# Patient Record
Sex: MALE | Race: WHITE | NOT HISPANIC OR LATINO | Employment: UNEMPLOYED | URBAN - METROPOLITAN AREA
[De-identification: names, ages, dates, MRNs, and addresses within clinical notes are randomized per-mention and may not be internally consistent; named-entity substitution may affect disease eponyms.]

---

## 2018-01-03 ENCOUNTER — HOSPITAL ENCOUNTER (EMERGENCY)
Facility: CLINIC | Age: 28
Discharge: OTHER INSTITUTION | End: 2018-01-04
Attending: EMERGENCY MEDICINE | Admitting: EMERGENCY MEDICINE
Payer: COMMERCIAL

## 2018-01-03 DIAGNOSIS — R45.851 SUICIDAL IDEATION: ICD-10-CM

## 2018-01-03 DIAGNOSIS — F15.10 METHAMPHETAMINE ABUSE (H): ICD-10-CM

## 2018-01-03 DIAGNOSIS — F11.20 UNCOMPLICATED OPIOID DEPENDENCE (H): ICD-10-CM

## 2018-01-03 DIAGNOSIS — F12.10 MARIJUANA ABUSE: ICD-10-CM

## 2018-01-03 LAB
ALBUMIN SERPL-MCNC: 3.8 G/DL (ref 3.4–5)
ALP SERPL-CCNC: 58 U/L (ref 40–150)
ALT SERPL W P-5'-P-CCNC: 19 U/L (ref 0–70)
AMPHETAMINES UR QL SCN: POSITIVE
ANION GAP SERPL CALCULATED.3IONS-SCNC: 8 MMOL/L (ref 3–14)
AST SERPL W P-5'-P-CCNC: 21 U/L (ref 0–45)
BARBITURATES UR QL: NEGATIVE
BASOPHILS # BLD AUTO: 0 10E9/L (ref 0–0.2)
BASOPHILS NFR BLD AUTO: 0.2 %
BENZODIAZ UR QL: NEGATIVE
BILIRUB SERPL-MCNC: 1.4 MG/DL (ref 0.2–1.3)
BUN SERPL-MCNC: 11 MG/DL (ref 7–30)
CALCIUM SERPL-MCNC: 9.1 MG/DL (ref 8.5–10.1)
CANNABINOIDS UR QL SCN: POSITIVE
CHLORIDE SERPL-SCNC: 104 MMOL/L (ref 94–109)
CO2 SERPL-SCNC: 27 MMOL/L (ref 20–32)
COCAINE UR QL: NEGATIVE
CREAT SERPL-MCNC: 0.99 MG/DL (ref 0.66–1.25)
DIFFERENTIAL METHOD BLD: NORMAL
EOSINOPHIL # BLD AUTO: 0.2 10E9/L (ref 0–0.7)
EOSINOPHIL NFR BLD AUTO: 2 %
ERYTHROCYTE [DISTWIDTH] IN BLOOD BY AUTOMATED COUNT: 12.2 % (ref 10–15)
ETHANOL UR QL SCN: NEGATIVE
GFR SERPL CREATININE-BSD FRML MDRD: >90 ML/MIN/1.7M2
GLUCOSE SERPL-MCNC: 85 MG/DL (ref 70–99)
HCT VFR BLD AUTO: 45.4 % (ref 40–53)
HGB BLD-MCNC: 15.4 G/DL (ref 13.3–17.7)
IMM GRANULOCYTES # BLD: 0 10E9/L (ref 0–0.4)
IMM GRANULOCYTES NFR BLD: 0.1 %
LYMPHOCYTES # BLD AUTO: 4 10E9/L (ref 0.8–5.3)
LYMPHOCYTES NFR BLD AUTO: 46.1 %
MCH RBC QN AUTO: 29.7 PG (ref 26.5–33)
MCHC RBC AUTO-ENTMCNC: 33.9 G/DL (ref 31.5–36.5)
MCV RBC AUTO: 88 FL (ref 78–100)
MONOCYTES # BLD AUTO: 0.6 10E9/L (ref 0–1.3)
MONOCYTES NFR BLD AUTO: 7.1 %
NEUTROPHILS # BLD AUTO: 3.8 10E9/L (ref 1.6–8.3)
NEUTROPHILS NFR BLD AUTO: 44.5 %
NRBC # BLD AUTO: 0 10*3/UL
NRBC BLD AUTO-RTO: 0 /100
OPIATES UR QL SCN: NEGATIVE
PLATELET # BLD AUTO: 201 10E9/L (ref 150–450)
POTASSIUM SERPL-SCNC: 3.7 MMOL/L (ref 3.4–5.3)
PROT SERPL-MCNC: 6.1 G/DL (ref 6.8–8.8)
RBC # BLD AUTO: 5.18 10E12/L (ref 4.4–5.9)
SODIUM SERPL-SCNC: 139 MMOL/L (ref 133–144)
WBC # BLD AUTO: 8.6 10E9/L (ref 4–11)

## 2018-01-03 PROCEDURE — 99285 EMERGENCY DEPT VISIT HI MDM: CPT | Mod: 25 | Performed by: EMERGENCY MEDICINE

## 2018-01-03 PROCEDURE — 25000132 ZZH RX MED GY IP 250 OP 250 PS 637: Performed by: EMERGENCY MEDICINE

## 2018-01-03 PROCEDURE — 90791 PSYCH DIAGNOSTIC EVALUATION: CPT

## 2018-01-03 PROCEDURE — 80320 DRUG SCREEN QUANTALCOHOLS: CPT | Mod: 59 | Performed by: FAMILY MEDICINE

## 2018-01-03 PROCEDURE — 85025 COMPLETE CBC W/AUTO DIFF WBC: CPT | Performed by: EMERGENCY MEDICINE

## 2018-01-03 PROCEDURE — 99284 EMERGENCY DEPT VISIT MOD MDM: CPT | Mod: Z6 | Performed by: EMERGENCY MEDICINE

## 2018-01-03 PROCEDURE — 80053 COMPREHEN METABOLIC PANEL: CPT | Performed by: EMERGENCY MEDICINE

## 2018-01-03 PROCEDURE — 80307 DRUG TEST PRSMV CHEM ANLYZR: CPT | Performed by: FAMILY MEDICINE

## 2018-01-03 RX ORDER — ALBUTEROL SULFATE 90 UG/1
1-2 AEROSOL, METERED RESPIRATORY (INHALATION) EVERY 6 HOURS PRN
Status: ON HOLD | COMMUNITY
End: 2024-07-06

## 2018-01-03 RX ORDER — NICOTINE 21 MG/24HR
1 PATCH, TRANSDERMAL 24 HOURS TRANSDERMAL ONCE
Status: COMPLETED | OUTPATIENT
Start: 2018-01-03 | End: 2018-01-03

## 2018-01-03 RX ORDER — TRAZODONE HYDROCHLORIDE 50 MG/1
50 TABLET, FILM COATED ORAL AT BEDTIME
Status: DISCONTINUED | OUTPATIENT
Start: 2018-01-03 | End: 2018-01-04 | Stop reason: HOSPADM

## 2018-01-03 RX ORDER — IBUPROFEN 800 MG/1
800 TABLET, FILM COATED ORAL EVERY 8 HOURS PRN
COMMUNITY

## 2018-01-03 RX ORDER — OLANZAPINE 10 MG/1
10 TABLET, ORALLY DISINTEGRATING ORAL ONCE
Status: DISCONTINUED | OUTPATIENT
Start: 2018-01-03 | End: 2018-01-03

## 2018-01-03 RX ORDER — ACETAMINOPHEN 500 MG
1000 TABLET ORAL EVERY 6 HOURS PRN
COMMUNITY

## 2018-01-03 RX ORDER — BUPRENORPHINE HYDROCHLORIDE AND NALOXONE HYDROCHLORIDE DIHYDRATE 8; 2 MG/1; MG/1
TABLET SUBLINGUAL
Status: ON HOLD | COMMUNITY
End: 2024-07-06

## 2018-01-03 RX ADMIN — TRAZODONE HYDROCHLORIDE 50 MG: 50 TABLET ORAL at 21:31

## 2018-01-03 RX ADMIN — NICOTINE 1 PATCH: 21 PATCH, EXTENDED RELEASE TRANSDERMAL at 20:04

## 2018-01-03 ASSESSMENT — ENCOUNTER SYMPTOMS
NERVOUS/ANXIOUS: 1
HALLUCINATIONS: 0
DYSPHORIC MOOD: 1

## 2018-01-03 NOTE — ED PROVIDER NOTES
"  History     Chief Complaint   Patient presents with     Depression     increasing depression and anxiety since breakup a few weeks ago, denies SI currently \"but I don't want to get any worse; I'm getting close to that point\"     HPI  Shailesh Armendariz is a 27 year old male who was brought to the ED by his father for suicidal thoughts.  He says he feels that if he doesn't get help, he won't make a suicide attempt, he'll complete the action.  He has hx of depression and anxiety for 10 years.  He also has hx of opiate dependence  (on suboxone currently via [place] ), methamphetamine abuse and thc abuse.  He says he has thoughts to crash the car which would be easy or take a  full of pills.  He and his fiance broke up 2 weeks ago.  They have broken up several times in the past 2 years.  She was using meth.  He also uses this on and off, but prefers to not use.  He was living with her and her family in a 3 bedroom apartment with 7 others.  He is now living with his dad.  His grandma passed away.  He is not employed.  He has hx of benzo abuse but hasn't used in 3-4 years.  He has no therapist or psychiatrist.  He says he has never been admitted for mental health issues.       I have reviewed the Medications, Allergies, Past Medical and Surgical History, and Social History in the Epic system.    Review of Systems   Psychiatric/Behavioral: Positive for dysphoric mood and suicidal ideas. Negative for hallucinations. Self-injury: he tried cutting becasue his fiance cuts. The patient is nervous/anxious.    All other systems reviewed and are negative.      Physical Exam   BP: (!) 142/99  Pulse: 130  Temp: 99.5  F (37.5  C)  Resp: 16  SpO2: 96 %      Physical Exam   Constitutional: He is oriented to person, place, and time. He appears well-developed and well-nourished. No distress.   HENT:   Head: Normocephalic and atraumatic.   Right Ear: External ear normal.   Left Ear: External ear normal.   Nose: Nose normal. "   Mouth/Throat: Oropharynx is clear and moist.   Eyes: EOM are normal. Pupils are equal, round, and reactive to light.   Neck: Normal range of motion.   Cardiovascular: Normal rate, regular rhythm and normal heart sounds.    Pulmonary/Chest: Effort normal and breath sounds normal.   Abdominal: Soft. Bowel sounds are normal. There is no tenderness.   Musculoskeletal: Normal range of motion.   Neurological: He is alert and oriented to person, place, and time.   Skin: Skin is warm and dry. He is not diaphoretic.   Psychiatric: His speech is normal and behavior is normal. Judgment normal. His mood appears anxious. Cognition and memory are normal. He expresses suicidal ideation. He expresses suicidal plans.   Nursing note and vitals reviewed.      ED Course     ED Course     Procedures           Labs Ordered and Resulted from Time of ED Arrival Up to the Time of Departure from the ED   DRUG ABUSE SCREEN 6 CHEM DEP URINE ([facility]) - Abnormal; Notable for the following:        Result Value    Amphetamine Qual Urine Positive (*)     Cannabinoids Qual Urine Positive (*)     All other components within normal limits            Assessments & Plan (with Medical Decision Making)   The patient had his father bring him to the ER today for admission. He says he has struggled with depression and anxiety for the past 10 years.  He has been having suicidal thoughts and feels that if he doesn't get help soon he will kill himself.   He is on suboxone for opiate dependence. He uses thc regularly.  He uses meth occasionally.  He had his suboxone dose today.  He is medically stable for admission to inpatient mental health.  There are currently no beds available so he will sleep in the ED awaiting available inpatient bed.     He is starting to wonder if he would like to leave due to the wait, but for now is welling to stay.  I would place him on a hold if he chooses to leave.  He was given trazodone to help him sleep tonight.     I have  reviewed the nursing notes.    I have reviewed the findings, diagnosis, plan and need for follow up with the patient.    New Prescriptions    No medications on file       Final diagnoses:   Suicidal ideation   Marijuana abuse   Methamphetamine abuse   Uncomplicated opioid dependence (H)       1/3/2018   [facility], [place], EMERGENCY DEPARTMENT        Yudi Smith MD  01/03/18 1584

## 2018-01-04 VITALS
WEIGHT: 150 LBS | DIASTOLIC BLOOD PRESSURE: 52 MMHG | OXYGEN SATURATION: 94 % | RESPIRATION RATE: 18 BRPM | TEMPERATURE: 99.5 F | SYSTOLIC BLOOD PRESSURE: 96 MMHG | HEART RATE: 130 BPM

## 2018-01-04 NOTE — PHARMACY-ADMISSION MEDICATION HISTORY
Admission medication history interview status for the 1/3/2018 admission is complete. See Epic admission navigator for allergy information, pharmacy, prior to admission medications and immunization status.     Medication history interview sources:    -Patient was a good historian and familiar with most medications. Patient could not recall names/doses of all supplements he is taking.     Changes made to PTA medication list (reason)  Added:   Per patient:  -Fish Oil Supplement   -One-A-Day Mens Multivitamin   -Stress B Vitamin  -Vitamin B12  -Vitamin B6  -Turmeric Supplement   -Coconut Oil Supplement   Deleted:   -N/A  Changed:   -N/A    Additional medication history information (including reliability of information, actions taken by pharmacist):  -Verified Suboxone dose of 20 mg daily with [facility]). Per [place], patient takes Suboxone all at once in the clinic and does not take any home. Patient last visited [place] on 1/1/17. Per patient, he wants to transition off Suboxone (unknown reason).   -Patient was residing in [place], [place] and obtained inhalers through a free clinic.  -Per patient, he also takes about seven or eight vitamins/supplements but cannot name them all. He tries to take them daily.   -Per patient, he received an influenza vaccination about two months ago.       Prior to Admission medications    Medication Sig Last Dose Taking? Auth Provider   fluticasone-salmeterol (ADVAIR) 500-50 MCG/DOSE diskus inhaler Inhale 1 puff into the lungs every 12 hours Past Week at Unknown time Yes Reported, Patient   buprenorphine-naloxone (SUBOXONE) 8-2 MG SUBL sublingual tablet Place 20mg under the tongue daily at [facility]) 1/1/2018 at Unknown time Yes Reported, Patient   albuterol (PROAIR HFA/PROVENTIL HFA/VENTOLIN HFA) 108 (90 BASE) MCG/ACT Inhaler Inhale 1-2 puffs into the lungs every 6 hours as needed for shortness of breath / dyspnea 1/3/2018 at Unknown time Yes Unknown,  Entered By History   Omega-3 Fatty Acids (FISH OIL PO) Take 1 capsule by mouth daily (unknown dose) Past Week at Unknown time Yes Unknown, Entered By History   Multiple Vitamin (ONE-A-DAY MENS PO) Take 1 tablet by mouth daily Past Week at Unknown time Yes Unknown, Entered By History   Multiple Vitamin (STRESS B PO) Take 1 tablet by mouth daily (unknown dose) Past Week at Unknown time Yes Unknown, Entered By History   Cyanocobalamin (B-12 PO) Take 1 tablet by mouth daily (unknown dose) Past Week at Unknown time Yes Unknown, Entered By History   Pyridoxine HCl (VITAMIN B6 PO) Take 1 tablet by mouth daily (unknown dose) Past Week at Unknown time Yes Unknown, Entered By History   TURMERIC PO Take 1 capsule by mouth daily (unknown dose) Past Week at Unknown time Yes Unknown, Entered By History   COCONUT OIL PO Take 1 capsule by mouth daily (unknown dose) Past Week at Unknown time Yes Unknown, Entered By History   acetaminophen (TYLENOL) 500 MG tablet Take 1,000 mg by mouth every 6 hours as needed for mild pain 1/3/2018 at Unknown time Yes Unknown, Entered By History   ibuprofen (ADVIL/MOTRIN) 800 MG tablet Take 800 mg by mouth every 8 hours as needed for moderate pain Past Week at Unknown time Yes Unknown, Entered By History         Medication history completed by: Antonette Pearl, Pharmacy Intern

## 2018-01-15 ENCOUNTER — COMMUNICATION - HEALTHEAST (OUTPATIENT)
Dept: INTERNAL MEDICINE | Facility: CLINIC | Age: 28
End: 2018-01-15

## 2018-01-26 ENCOUNTER — RECORDS - HEALTHEAST (OUTPATIENT)
Dept: ADMINISTRATIVE | Facility: OTHER | Age: 28
End: 2018-01-26

## 2020-10-01 ENCOUNTER — COMMUNICATION - HEALTHEAST (OUTPATIENT)
Dept: BEHAVIORAL HEALTH | Facility: CLINIC | Age: 30
End: 2020-10-01

## 2020-10-03 ENCOUNTER — COMMUNICATION - HEALTHEAST (OUTPATIENT)
Dept: SCHEDULING | Facility: CLINIC | Age: 30
End: 2020-10-03

## 2020-10-05 ENCOUNTER — COMMUNICATION - HEALTHEAST (OUTPATIENT)
Dept: SCHEDULING | Facility: CLINIC | Age: 30
End: 2020-10-05

## 2021-05-31 VITALS — BODY MASS INDEX: 20.81 KG/M2 | WEIGHT: 145 LBS | BODY MASS INDEX: 20.22 KG/M2 | HEIGHT: 70 IN

## 2021-06-11 NOTE — TELEPHONE ENCOUNTER
S: [facility] ED A&R calling with a 30 yr old male with SI with a plan and delusions    B: pt is a 30 yr old male with a hx of substance use and concerns for SI with a plan. Pt has a recently relapse of substances and has potential concerns of paranoia and delusions. Pt thinks his father has bodies in his car and straying chemicals. Pt was sober for 2 years and possibly had a relapse. Pt utox + for amphetamines and cannabis. Pt called his sisters and left a goodbye message and that he was going to drown himself. [place], wandering near [place]. Pt will not state if there is active AH/VH but per collateral, pt remains paranoid with family. Pt is guarded in the ED. Pt is medically cleared and ambulating. Pt is not aggressive in the ED. Asymptomatic for COVD in ED. Needs to be collected     A: emergency medical hold     R:   Paged provider @ 1:18 pm  Bronson/[place]  Notified unit @ 1:21 pm   VM recording    Notified @ 1:25 pm  Notified ED Charge RN @ 1:27 pm

## 2021-06-16 PROBLEM — F12.20 SEVERE CANNABIS USE DISORDER (H): Chronic | Status: ACTIVE | Noted: 2018-01-04

## 2021-06-16 PROBLEM — F11.20 SEVERE OPIOID USE DISORDER (H): Chronic | Status: ACTIVE | Noted: 2018-01-04

## 2021-06-16 PROBLEM — F19.959 SUBSTANCE-INDUCED PSYCHOTIC DISORDER (H): Status: ACTIVE | Noted: 2018-10-22

## 2021-06-16 PROBLEM — F15.20 SEVERE AMPHETAMINE SUBSTANCE USE DISORDER (H): Chronic | Status: ACTIVE | Noted: 2018-01-04

## 2021-06-16 PROBLEM — F29 PSYCHOSIS, ATYPICAL (H): Status: ACTIVE | Noted: 2020-10-01

## 2021-06-16 PROBLEM — Z02.9 ENCOUNTERS FOR ADMINISTRATIVE PURPOSE: Chronic | Status: ACTIVE | Noted: 2018-01-04

## 2021-06-16 PROBLEM — R45.851 SUICIDAL IDEATION: Status: ACTIVE | Noted: 2020-10-01

## 2021-06-16 PROBLEM — F31.62 BIPOLAR MIXED AFFECTIVE DISORDER, MODERATE (H): Chronic | Status: ACTIVE | Noted: 2018-10-22

## 2023-01-02 VITALS
WEIGHT: 160 LBS | DIASTOLIC BLOOD PRESSURE: 98 MMHG | SYSTOLIC BLOOD PRESSURE: 144 MMHG | HEART RATE: 78 BPM | HEIGHT: 70 IN | TEMPERATURE: 97.8 F | OXYGEN SATURATION: 96 % | RESPIRATION RATE: 18 BRPM | BODY MASS INDEX: 22.9 KG/M2

## 2023-01-02 PROCEDURE — 99283 EMERGENCY DEPT VISIT LOW MDM: CPT

## 2023-01-03 ENCOUNTER — HOSPITAL ENCOUNTER (EMERGENCY)
Facility: HOSPITAL | Age: 33
Discharge: HOME | End: 2023-01-03
Attending: EMERGENCY MEDICINE | Admitting: EMERGENCY MEDICINE
Payer: COMMERCIAL

## 2023-01-03 DIAGNOSIS — J01.00 ACUTE NON-RECURRENT MAXILLARY SINUSITIS: ICD-10-CM

## 2023-01-03 PROBLEM — F19.90 SUBSTANCE USE DISORDER: Status: ACTIVE | Noted: 2022-02-28

## 2023-01-03 PROBLEM — J45.909 ASTHMA, UNSPECIFIED ASTHMA SEVERITY, UNSPECIFIED WHETHER COMPLICATED, UNSPECIFIED WHETHER PERSISTENT: Status: ACTIVE | Noted: 2019-01-17

## 2023-01-03 PROBLEM — D23.9 BENIGN NEOPLASM OF SKIN: Status: ACTIVE | Noted: 2019-01-17

## 2023-01-03 PROBLEM — J34.89 OTHER DISEASES OF NASAL CAVITY AND SINUSES: Status: ACTIVE | Noted: 2019-06-03

## 2023-01-03 PROBLEM — F41.9 ANXIETY: Chronic | Status: ACTIVE | Noted: 2018-01-04

## 2023-01-03 PROBLEM — H65.90 NONSUPPURATIVE OTITIS MEDIA: Status: ACTIVE | Noted: 2019-01-17

## 2023-01-03 ASSESSMENT — ENCOUNTER SYMPTOMS
HEADACHES: 1
EYE PAIN: 1
PHOTOPHOBIA: 1
SINUS PAIN: 1
FEVER: 1
CHILLS: 1

## 2023-01-03 NOTE — ED TRIAGE NOTES
Patient developed a fever and right sinus pain approximately six hours ago. Ibuprofen at home. Patient reports a history of hospitalization following sinusitis, reports a history of periorbital cellulitis related to sinus infection. Reports pain and pressure in right eye, right upper jaw and teeth.      Triage Assessment     Row Name 01/02/23 2601       Triage Assessment (Adult)    Airway WDL WDL       Respiratory WDL    Respiratory WDL WDL       Skin Circulation/Temperature WDL    Skin Circulation/Temperature WDL WDL       Cardiac WDL    Cardiac WDL WDL       Peripheral/Neurovascular WDL    Peripheral Neurovascular WDL WDL       Cognitive/Neuro/Behavioral WDL    Cognitive/Neuro/Behavioral WDL WDL

## 2023-01-03 NOTE — ED PROVIDER NOTES
EMERGENCY DEPARTMENT ENCOUNTER      NAME: Shailesh Armendariz  AGE: 32 year old male  YOB: 1990  MRN: [id number]  EVALUATION DATE & TIME: No admission date for patient encounter.    PCP: No Ref-Primary, Physician    ED PROVIDER: Nicole Hassan MD      Chief Complaint   Patient presents with     Fever     Sinusitis         FINAL IMPRESSION:  1. Acute non-recurrent maxillary sinusitis          ED COURSE & MEDICAL DECISION MAKING:    Pertinent Labs & Imaging studies reviewed. (See chart for details)  32 year old male presents to the Emergency Department for evaluation of right-sided facial pain that started about 4 to 6 hours prior to arrival.  The patient noted swelling on the right side of the face.  On my exam, patient does have some soft tissue edema overlying the right maxillary sinus.  The patient's main concern is that he previously had sinusitis in ninth grade which then became a periorbital cellulitis and required admission to the hospital.  The last time he had any dental work was 2 months ago when he had dental fillings.  Since then, he denies any other issues.  He is not immunosuppressed, is not a diabetic.  Given the tenderness over the right maxillary sinus, the patient will be initiated on oral antibiotics.  I discussed with him reasons to return including increased swelling, pain, worsening symptoms after 48 hours on antibiotics, otherwise close follow-up with his primary care provider.  Patient and patient's father at the bedside are comfortable with this plan.    12:32 AM I met with the patient, obtained history, performed an initial exam, and discussed options and plan for diagnostics and treatment here in the ED. We discussed the plan for discharge and the patient is agreeable. Reviewed supportive cares, symptomatic treatment, outpatient follow up, and reasons to return to the Emergency Department. Patient to be discharged by ED RN.     At the conclusion of the encounter I discussed the  results of all of the tests and the disposition. The questions were answered. The patient or family acknowledged understanding and was agreeable with the care plan.       Medical Decision Making    History:    Supplemental history from: N/A    External Record(s) reviewed: Documented in HPI, if applicable.    Work Up:    Chart documentation includes differential considered and any EKGs or imaging independently interpreted by provider.    In additional to work up documented, I considered the following work up: See chart documentation, if applicable.    External consultation:    Discussion of management with another provider: See chart documentation, if applicable    Complicating factors:    Care impacted by chronic illness: N/A    Care affected by social determinants of health: N/A    Disposition considerations: Discharge. I prescribed additional prescription strength medication(s) as charted. N/A.        MEDICATIONS GIVEN IN THE EMERGENCY:  Medications - No data to display    NEW PRESCRIPTIONS STARTED AT TODAY'S ER VISIT  Current Discharge Medication List      START taking these medications    Details   amoxicillin-clavulanate (AUGMENTIN) 875-125 MG tablet Take 1 tablet by mouth 2 times daily  Qty: 14 tablet, Refills: 0                =================================================================    [facility]    Patient information was obtained from: patient     Use of : N/A         Shailesh Armendariz is a 32 year old male with a pertinent history of asthma and polysubstance use who presents to this ED by walk in for evaluation of eye pain.    Around 6 hours ago, the patient noticed swelling in his right maxillary sinuses with associated sharp pain that radiates into his right eye and temple and slight photophobia. Pain is exacerbated with opening and closing his eye. He had some relief of symptoms after taking 600 mg Ibuprofen and icing the area soon after onset. He also endorses congestion and tactile fever and  chills. He does not have a thermometer at home. Notes that he had similar symptoms in 9th grade, and it turned into periorbital cellulitis, so he is concerned for that. Also notes he had dental fillings and cleaning a few months ago without issue and has not had any tooth pain. No medical history and is not immunosuppressed. Patient is a smoker. He denies cough or any other complaints at this time.      REVIEW OF SYSTEMS   Review of Systems   Constitutional: Positive for chills and fever (tactile).   HENT: Positive for congestion and sinus pain.         Positive for sinus swelling (right maxillary).   Eyes: Positive for photophobia and pain (right, radiates from sinus).   Neurological: Positive for headaches (right temple, radiates from sinus).        PAST MEDICAL HISTORY:  Past Medical History:   Diagnosis Date     Asthma      Bipolar disorder (H)      Uncomplicated asthma        PAST SURGICAL HISTORY:  History reviewed. No pertinent surgical history.        CURRENT MEDICATIONS:    amoxicillin-clavulanate (AUGMENTIN) 875-125 MG tablet  acetaminophen (TYLENOL) 500 MG tablet  albuterol (PROAIR HFA/PROVENTIL HFA/VENTOLIN HFA) 108 (90 BASE) MCG/ACT Inhaler  buprenorphine-naloxone (SUBOXONE) 8-2 MG SUBL sublingual tablet  COCONUT OIL PO  Cyanocobalamin (B-12 PO)  fluticasone-salmeterol (ADVAIR) 500-50 MCG/DOSE diskus inhaler  ibuprofen (ADVIL/MOTRIN) 800 MG tablet  Multiple Vitamin (ONE-A-DAY MENS PO)  Multiple Vitamin (STRESS B PO)  Omega-3 Fatty Acids (FISH OIL PO)  Pyridoxine HCl (VITAMIN B6 PO)  TURMERIC PO        ALLERGIES:  No Known Allergies    FAMILY HISTORY:  Family History   Problem Relation Age of Onset     Schizophrenia Paternal Grandfather      Schizophrenia Mother        SOCIAL HISTORY:   Social History     Socioeconomic History     Marital status: Single   Tobacco Use     Smoking status: Every Day     Packs/day: 1.00     Years: 10.00     Pack years: 10.00     Types: Cigarettes     Smokeless tobacco: Never  "  Substance and Sexual Activity     Alcohol use: Yes     Comment: Alcoholic Drinks/day: 0ccasional     Drug use: Yes     Types: Marijuana, Methamphetamines, Amphetamines     Comment: Drug use: hx benzodiazepines, PCP, heroin- including IV     Sexual activity: Yes     Partners: Female     Birth control/protection: Condom       VITALS:  BP (!) 144/98   Pulse 78   Temp 97.8  F (36.6  C) (Temporal)   Resp 18   Ht 1.778 m (5' 10\")   Wt 72.6 kg (160 lb)   SpO2 96%   BMI 22.96 kg/m      PHYSICAL EXAM    Constitutional: Well developed, Well nourished, NAD  HENT: Soft tissue edema overlying right maxillary sinuses with associated erythema. Tender to palpation. Dentition intact. Normocephalic, Atraumatic, Bilateral external ears normal, Oropharynx normal, mucous membranes moist, Nose normal.   Neck- Normal range of motion, No tenderness, Supple, No stridor.  Eyes: No periorbital edema or erythema. EOM intact with no associated pain. PERRL, Conjunctiva normal, No discharge.   Respiratory: Normal breath sounds, No respiratory distress  Cardiovascular: Normal heart rate, Regular rhythm  GI: Bowel sounds normal, Soft, No tenderness,   Musculoskeletal: No edema. Good range of motion in all major joints. No tenderness to palpation or major deformities noted.   Integument: Warm, Dry, No erythema, No rash  Neurologic: Alert & oriented x 3, Normal motor function, Normal sensory function, No focal deficits noted. Normal gait.   Psychiatric: Affect normal, Judgment normal, Mood normal.     LAB:  All pertinent labs reviewed and interpreted.       RADIOLOGY:  Reviewed all pertinent imaging. Please see official radiology report.  No orders to display       EKG:    None    PROCEDURES:   None      I, Ivonne Macario, am serving as a scribe to document services personally performed by Nicole Hassan, based on my observation and the provider's statements to me. I, Nicole Hassan MD, attest that Ivonne Macario is acting in a scribe " capacity, has observed my performance of the services and has documented them in accordance with my direction.    Nicole Hassan MD  Emergency Medicine  [facility] EMERGENCY DEPARTMENT  [address] [place] [id number] [phone]     Nicole Hassan MD  01/03/23 0135

## 2024-07-05 ENCOUNTER — TELEPHONE (OUTPATIENT)
Dept: BEHAVIORAL HEALTH | Facility: CLINIC | Age: 34
End: 2024-07-05

## 2024-07-05 ENCOUNTER — HOSPITAL ENCOUNTER (INPATIENT)
Facility: CLINIC | Age: 34
LOS: 4 days | Discharge: PSYCHIATRIC OR SUBSTANCE USE FACILITY | End: 2024-07-09
Attending: FAMILY MEDICINE | Admitting: PSYCHIATRY & NEUROLOGY
Payer: MEDICAID

## 2024-07-05 DIAGNOSIS — F10.229 ALCOHOL DEPENDENCE WITH INTOXICATION WITH COMPLICATION (H): ICD-10-CM

## 2024-07-05 LAB
ALBUMIN SERPL BCG-MCNC: 4.9 G/DL (ref 3.5–5.2)
ALP SERPL-CCNC: 58 U/L (ref 40–150)
ALT SERPL W P-5'-P-CCNC: 47 U/L (ref 0–70)
AMPHETAMINES UR QL SCN: ABNORMAL
ANION GAP SERPL CALCULATED.3IONS-SCNC: 16 MMOL/L (ref 7–15)
AST SERPL W P-5'-P-CCNC: 41 U/L (ref 0–45)
BARBITURATES UR QL SCN: ABNORMAL
BASOPHILS # BLD AUTO: 0.1 10E3/UL (ref 0–0.2)
BASOPHILS NFR BLD AUTO: 1 %
BENZODIAZ UR QL SCN: ABNORMAL
BILIRUB SERPL-MCNC: 0.2 MG/DL
BUN SERPL-MCNC: 9.3 MG/DL (ref 6–20)
BZE UR QL SCN: ABNORMAL
CALCIUM SERPL-MCNC: 9.8 MG/DL (ref 8.6–10)
CANNABINOIDS UR QL SCN: ABNORMAL
CHLORIDE SERPL-SCNC: 103 MMOL/L (ref 98–107)
CREAT SERPL-MCNC: 0.89 MG/DL (ref 0.67–1.17)
DEPRECATED HCO3 PLAS-SCNC: 27 MMOL/L (ref 22–29)
EGFRCR SERPLBLD CKD-EPI 2021: >90 ML/MIN/1.73M2
EOSINOPHIL # BLD AUTO: 0.1 10E3/UL (ref 0–0.7)
EOSINOPHIL NFR BLD AUTO: 2 %
ERYTHROCYTE [DISTWIDTH] IN BLOOD BY AUTOMATED COUNT: 13.5 % (ref 10–15)
ETHANOL SERPL-MCNC: 0.27 G/DL
FENTANYL UR QL: ABNORMAL
GLUCOSE SERPL-MCNC: 98 MG/DL (ref 70–99)
HCT VFR BLD AUTO: 47.8 % (ref 40–53)
HGB BLD-MCNC: 16.3 G/DL (ref 13.3–17.7)
IMM GRANULOCYTES # BLD: 0.1 10E3/UL
IMM GRANULOCYTES NFR BLD: 1 %
LYMPHOCYTES # BLD AUTO: 2 10E3/UL (ref 0.8–5.3)
LYMPHOCYTES NFR BLD AUTO: 26 %
MAGNESIUM SERPL-MCNC: 2.2 MG/DL (ref 1.7–2.3)
MCH RBC QN AUTO: 32.3 PG (ref 26.5–33)
MCHC RBC AUTO-ENTMCNC: 34.1 G/DL (ref 31.5–36.5)
MCV RBC AUTO: 95 FL (ref 78–100)
MONOCYTES # BLD AUTO: 0.6 10E3/UL (ref 0–1.3)
MONOCYTES NFR BLD AUTO: 7 %
NEUTROPHILS # BLD AUTO: 4.9 10E3/UL (ref 1.6–8.3)
NEUTROPHILS NFR BLD AUTO: 63 %
NRBC # BLD AUTO: 0 10E3/UL
NRBC BLD AUTO-RTO: 0 /100
OPIATES UR QL SCN: ABNORMAL
PCP QUAL URINE (ROCHE): ABNORMAL
PLATELET # BLD AUTO: 287 10E3/UL (ref 150–450)
POTASSIUM SERPL-SCNC: 4.2 MMOL/L (ref 3.4–5.3)
PROT SERPL-MCNC: 7.4 G/DL (ref 6.4–8.3)
RBC # BLD AUTO: 5.04 10E6/UL (ref 4.4–5.9)
SODIUM SERPL-SCNC: 146 MMOL/L (ref 135–145)
WBC # BLD AUTO: 7.7 10E3/UL (ref 4–11)

## 2024-07-05 PROCEDURE — 82374 ASSAY BLOOD CARBON DIOXIDE: CPT | Performed by: FAMILY MEDICINE

## 2024-07-05 PROCEDURE — 82040 ASSAY OF SERUM ALBUMIN: CPT | Performed by: FAMILY MEDICINE

## 2024-07-05 PROCEDURE — 128N000004 HC R&B CD ADULT

## 2024-07-05 PROCEDURE — 80307 DRUG TEST PRSMV CHEM ANLYZR: CPT | Performed by: FAMILY MEDICINE

## 2024-07-05 PROCEDURE — 85025 COMPLETE CBC W/AUTO DIFF WBC: CPT | Performed by: FAMILY MEDICINE

## 2024-07-05 PROCEDURE — 83735 ASSAY OF MAGNESIUM: CPT | Performed by: FAMILY MEDICINE

## 2024-07-05 PROCEDURE — 250N000013 HC RX MED GY IP 250 OP 250 PS 637

## 2024-07-05 PROCEDURE — 99285 EMERGENCY DEPT VISIT HI MDM: CPT | Performed by: FAMILY MEDICINE

## 2024-07-05 PROCEDURE — 250N000013 HC RX MED GY IP 250 OP 250 PS 637: Performed by: FAMILY MEDICINE

## 2024-07-05 PROCEDURE — 36415 COLL VENOUS BLD VENIPUNCTURE: CPT | Performed by: FAMILY MEDICINE

## 2024-07-05 PROCEDURE — 82077 ASSAY SPEC XCP UR&BREATH IA: CPT | Performed by: FAMILY MEDICINE

## 2024-07-05 PROCEDURE — 250N000011 HC RX IP 250 OP 636

## 2024-07-05 PROCEDURE — HZ2ZZZZ DETOXIFICATION SERVICES FOR SUBSTANCE ABUSE TREATMENT: ICD-10-PCS | Performed by: PSYCHIATRY & NEUROLOGY

## 2024-07-05 RX ORDER — ATENOLOL 50 MG/1
50 TABLET ORAL DAILY PRN
Status: DISCONTINUED | OUTPATIENT
Start: 2024-07-05 | End: 2024-07-06

## 2024-07-05 RX ORDER — FOLIC ACID 1 MG/1
1 TABLET ORAL DAILY
Status: DISCONTINUED | OUTPATIENT
Start: 2024-07-05 | End: 2024-07-09 | Stop reason: HOSPADM

## 2024-07-05 RX ORDER — OLANZAPINE 10 MG/1
10 TABLET ORAL 3 TIMES DAILY PRN
Status: DISCONTINUED | OUTPATIENT
Start: 2024-07-05 | End: 2024-07-09 | Stop reason: HOSPADM

## 2024-07-05 RX ORDER — DIAZEPAM 5 MG
5-20 TABLET ORAL EVERY 30 MIN PRN
Status: DISCONTINUED | OUTPATIENT
Start: 2024-07-05 | End: 2024-07-05

## 2024-07-05 RX ORDER — NICOTINE 21 MG/24HR
1 PATCH, TRANSDERMAL 24 HOURS TRANSDERMAL ONCE
Status: COMPLETED | OUTPATIENT
Start: 2024-07-05 | End: 2024-07-06

## 2024-07-05 RX ORDER — TRAZODONE HYDROCHLORIDE 50 MG/1
50 TABLET, FILM COATED ORAL
Status: DISCONTINUED | OUTPATIENT
Start: 2024-07-05 | End: 2024-07-09 | Stop reason: HOSPADM

## 2024-07-05 RX ORDER — DIAZEPAM 5 MG
5-20 TABLET ORAL EVERY 30 MIN PRN
Status: DISCONTINUED | OUTPATIENT
Start: 2024-07-05 | End: 2024-07-08

## 2024-07-05 RX ORDER — OLANZAPINE 10 MG/2ML
10 INJECTION, POWDER, FOR SOLUTION INTRAMUSCULAR 3 TIMES DAILY PRN
Status: DISCONTINUED | OUTPATIENT
Start: 2024-07-05 | End: 2024-07-09 | Stop reason: HOSPADM

## 2024-07-05 RX ORDER — AMOXICILLIN 250 MG
1 CAPSULE ORAL 2 TIMES DAILY PRN
Status: DISCONTINUED | OUTPATIENT
Start: 2024-07-05 | End: 2024-07-09 | Stop reason: HOSPADM

## 2024-07-05 RX ORDER — MAGNESIUM HYDROXIDE/ALUMINUM HYDROXICE/SIMETHICONE 120; 1200; 1200 MG/30ML; MG/30ML; MG/30ML
30 SUSPENSION ORAL EVERY 4 HOURS PRN
Status: DISCONTINUED | OUTPATIENT
Start: 2024-07-05 | End: 2024-07-09 | Stop reason: HOSPADM

## 2024-07-05 RX ORDER — ONDANSETRON 4 MG/1
4 TABLET, FILM COATED ORAL EVERY 6 HOURS PRN
Status: DISCONTINUED | OUTPATIENT
Start: 2024-07-05 | End: 2024-07-09 | Stop reason: HOSPADM

## 2024-07-05 RX ORDER — LOPERAMIDE HCL 2 MG
2 CAPSULE ORAL 4 TIMES DAILY PRN
Status: DISCONTINUED | OUTPATIENT
Start: 2024-07-05 | End: 2024-07-09 | Stop reason: HOSPADM

## 2024-07-05 RX ORDER — ACETAMINOPHEN 325 MG/1
650 TABLET ORAL EVERY 4 HOURS PRN
Status: DISCONTINUED | OUTPATIENT
Start: 2024-07-05 | End: 2024-07-09 | Stop reason: HOSPADM

## 2024-07-05 RX ORDER — HYDROXYZINE HYDROCHLORIDE 25 MG/1
25 TABLET, FILM COATED ORAL EVERY 4 HOURS PRN
Status: DISCONTINUED | OUTPATIENT
Start: 2024-07-05 | End: 2024-07-09 | Stop reason: HOSPADM

## 2024-07-05 RX ADMIN — NICOTINE 1 PATCH: 21 PATCH, EXTENDED RELEASE TRANSDERMAL at 12:08

## 2024-07-05 RX ADMIN — ONDANSETRON HYDROCHLORIDE 4 MG: 4 TABLET, FILM COATED ORAL at 18:37

## 2024-07-05 RX ADMIN — ACETAMINOPHEN 650 MG: 325 TABLET, FILM COATED ORAL at 18:37

## 2024-07-05 RX ADMIN — DIAZEPAM 10 MG: 5 TABLET ORAL at 20:13

## 2024-07-05 RX ADMIN — THIAMINE HCL TAB 100 MG 100 MG: 100 TAB at 16:16

## 2024-07-05 RX ADMIN — TRAZODONE HYDROCHLORIDE 50 MG: 50 TABLET ORAL at 21:29

## 2024-07-05 RX ADMIN — FOLIC ACID 1 MG: 1 TABLET ORAL at 16:16

## 2024-07-05 ASSESSMENT — ACTIVITIES OF DAILY LIVING (ADL)
ADLS_ACUITY_SCORE: 35
ADLS_ACUITY_SCORE: 30
ORAL_HYGIENE: INDEPENDENT
ADLS_ACUITY_SCORE: 35
ADLS_ACUITY_SCORE: 30
HYGIENE/GROOMING: INDEPENDENT
ADLS_ACUITY_SCORE: 30
ADLS_ACUITY_SCORE: 30
DRESS: STREET CLOTHES;INDEPENDENT
ADLS_ACUITY_SCORE: 35
ADLS_ACUITY_SCORE: 30
ADLS_ACUITY_SCORE: 35
ADLS_ACUITY_SCORE: 30
ADLS_ACUITY_SCORE: 30

## 2024-07-05 ASSESSMENT — LIFESTYLE VARIABLES: SKIP TO QUESTIONS 9-10: 0

## 2024-07-05 ASSESSMENT — COLUMBIA-SUICIDE SEVERITY RATING SCALE - C-SSRS
6. HAVE YOU EVER DONE ANYTHING, STARTED TO DO ANYTHING, OR PREPARED TO DO ANYTHING TO END YOUR LIFE?: NO
2. HAVE YOU ACTUALLY HAD ANY THOUGHTS OF KILLING YOURSELF IN THE PAST MONTH?: NO
1. IN THE PAST MONTH, HAVE YOU WISHED YOU WERE DEAD OR WISHED YOU COULD GO TO SLEEP AND NOT WAKE UP?: NO

## 2024-07-05 NOTE — ED PROVIDER NOTES
[facility] EMERGENCY DEPARTMENT ([facility])    7/05/24      ED PROVIDER NOTE   Ghulam C   History     Chief Complaint   Patient presents with    Alcohol Problem     Last drink this morning @ 0800.  Drinks 1/5 whiskey per day.  No history of seizures.  THC rare     The history is provided by the patient and medical records.     Shailesh Armendariz is a 33 year old male with history of bipolar disorder, alcohol use disorder, prior polysubstance use (meth, cannabinoids, opiates previously on Suboxone) who presents to the emergency department seeking alcohol detox. He has been drinking 1/5th to half a liter of vodka a day, last drank this morning. No history of withdrawal seizures or DTs. When he stops drinking he develops shakes, sweats, nausea and vomiting. He is planning on entering treatment 3 days from now. He also had THC drinks a couple days ago. He is off suboxone, denies other substance use. Denies psychiatric symptoms; no suicidal or homicidal ideation.     Past Medical History  Past Medical History:   Diagnosis Date    Asthma     Bipolar disorder (H)     Uncomplicated asthma      History reviewed. No pertinent surgical history.  acetaminophen (TYLENOL) 500 MG tablet  albuterol (PROAIR HFA/PROVENTIL HFA/VENTOLIN HFA) 108 (90 BASE) MCG/ACT Inhaler  amoxicillin-clavulanate (AUGMENTIN) 875-125 MG tablet  buprenorphine-naloxone (SUBOXONE) 8-2 MG SUBL sublingual tablet  COCONUT OIL PO  Cyanocobalamin (B-12 PO)  fluticasone-salmeterol (ADVAIR) 500-50 MCG/DOSE diskus inhaler  ibuprofen (ADVIL/MOTRIN) 800 MG tablet  Multiple Vitamin (ONE-A-DAY MENS PO)  Multiple Vitamin (STRESS B PO)  Omega-3 Fatty Acids (FISH OIL PO)  Pyridoxine HCl (VITAMIN B6 PO)  TURMERIC PO      Allergies   Allergen Reactions    Nickel Rash and Unknown     Family History  Family History   Problem Relation Age of Onset    Schizophrenia Paternal Grandfather     Schizophrenia Mother      Social History   Social History     Tobacco Use    Smoking  status: Every Day     Current packs/day: 0.25     Average packs/day: 0.5 packs/day for 26.5 years (14.1 ttl pk-yrs)     Types: Cigarettes     Start date: 2008    Smokeless tobacco: Never   Substance Use Topics    Alcohol use: Yes     Comment: Alcoholic Drinks/ everyday    Drug use: Not Currently      A medically appropriate review of systems was performed with pertinent positives and negatives noted in the HPI, and all other systems negative.    Physical Exam   BP: 136/84  Pulse: 101  Temp: 98.3  F (36.8  C)  Resp: 16  Weight: 81.9 kg (180 lb 8 oz)  SpO2: 94 %  Physical Exam  Vitals and nursing note reviewed.   Constitutional:       General: He is not in acute distress.     Appearance: Normal appearance. He is not toxic-appearing.   HENT:      Head: Atraumatic.   Eyes:      General: No scleral icterus.     Conjunctiva/sclera: Conjunctivae normal.   Cardiovascular:      Rate and Rhythm: Normal rate.      Heart sounds: Normal heart sounds.   Pulmonary:      Effort: Pulmonary effort is normal. No respiratory distress.      Breath sounds: Normal breath sounds.   Abdominal:      Palpations: Abdomen is soft.      Tenderness: There is no abdominal tenderness.   Musculoskeletal:         General: No deformity.      Cervical back: Neck supple.   Skin:     General: Skin is warm.   Neurological:      General: No focal deficit present.      Mental Status: He is alert and oriented to person, place, and time.   Psychiatric:         Mood and Affect: Mood normal.         Behavior: Behavior normal.         Thought Content: Thought content normal.         Judgment: Judgment normal.           ED Course, Procedures, & Data     ED Course as of 07/05/24 1247   Fri Jul 05, 2024   1101 Called to chemical dependency intake to inquire about detox beds, spoke to Padmaja.     Procedures                 Results for orders placed or performed during the hospital encounter of 07/05/24   Comprehensive metabolic panel     Status: Abnormal   Result  Value Ref Range    Sodium 146 (H) 135 - 145 mmol/L    Potassium 4.2 3.4 - 5.3 mmol/L    Carbon Dioxide (CO2) 27 22 - 29 mmol/L    Anion Gap 16 (H) 7 - 15 mmol/L    Urea Nitrogen 9.3 6.0 - 20.0 mg/dL    Creatinine 0.89 0.67 - 1.17 mg/dL    GFR Estimate >90 >60 mL/min/1.73m2    Calcium 9.8 8.6 - 10.0 mg/dL    Chloride 103 98 - 107 mmol/L    Glucose 98 70 - 99 mg/dL    Alkaline Phosphatase 58 40 - 150 U/L    AST 41 0 - 45 U/L    ALT 47 0 - 70 U/L    Protein Total 7.4 6.4 - 8.3 g/dL    Albumin 4.9 3.5 - 5.2 g/dL    Bilirubin Total 0.2 <=1.2 mg/dL   Magnesium     Status: Normal   Result Value Ref Range    Magnesium 2.2 1.7 - 2.3 mg/dL   Ethyl Alcohol Level     Status: Abnormal   Result Value Ref Range    Alcohol ethyl 0.27 (H) <=0.01 g/dL   Urine Drug Screen Panel     Status: Abnormal   Result Value Ref Range    Amphetamines Urine Screen Negative Screen Negative    Barbituates Urine Screen Negative Screen Negative    Benzodiazepine Urine Screen Negative Screen Negative    Cannabinoids Urine Screen Positive (A) Screen Negative    Cocaine Urine Screen Negative Screen Negative    Fentanyl Qual Urine Screen Negative Screen Negative    Opiates Urine Screen Negative Screen Negative    PCP Urine Screen Negative Screen Negative   CBC with platelets and differential     Status: None   Result Value Ref Range    WBC Count 7.7 4.0 - 11.0 10e3/uL    RBC Count 5.04 4.40 - 5.90 10e6/uL    Hemoglobin 16.3 13.3 - 17.7 g/dL    Hematocrit 47.8 40.0 - 53.0 %    MCV 95 78 - 100 fL    MCH 32.3 26.5 - 33.0 pg    MCHC 34.1 31.5 - 36.5 g/dL    RDW 13.5 10.0 - 15.0 %    Platelet Count 287 150 - 450 10e3/uL    % Neutrophils 63 %    % Lymphocytes 26 %    % Monocytes 7 %    % Eosinophils 2 %    % Basophils 1 %    % Immature Granulocytes 1 %    NRBCs per 100 WBC 0 <1 /100    Absolute Neutrophils 4.9 1.6 - 8.3 10e3/uL    Absolute Lymphocytes 2.0 0.8 - 5.3 10e3/uL    Absolute Monocytes 0.6 0.0 - 1.3 10e3/uL    Absolute Eosinophils 0.1 0.0 - 0.7 10e3/uL     Absolute Basophils 0.1 0.0 - 0.2 10e3/uL    Absolute Immature Granulocytes 0.1 <=0.4 10e3/uL    Absolute NRBCs 0.0 10e3/uL   CBC with platelets differential     Status: None    Narrative    The following orders were created for panel order CBC with platelets differential.  Procedure                               Abnormality         Status                     ---------                               -----------         ------                     CBC with platelets and d...[[id number]]                      Final result                 Please view results for these tests on the individual orders.   Urine Drug Screen     Status: Abnormal    Narrative    The following orders were created for panel order Urine Drug Screen.  Procedure                               Abnormality         Status                     ---------                               -----------         ------                     Urine Drug Screen Panel[[id number]]      Abnormal            Final result                 Please view results for these tests on the individual orders.     Medications   diazepam (VALIUM) tablet 5-20 mg (has no administration in time range)   nicotine (NICODERM CQ) 21 MG/24HR 24 hr patch 1 patch (1 patch Transdermal $Patch/Med Applied 7/5/24 1204)     Labs Ordered and Resulted from Time of ED Arrival to Time of ED Departure   COMPREHENSIVE METABOLIC PANEL - Abnormal       Result Value    Sodium 146 (*)     Potassium 4.2      Carbon Dioxide (CO2) 27      Anion Gap 16 (*)     Urea Nitrogen 9.3      Creatinine 0.89      GFR Estimate >90      Calcium 9.8      Chloride 103      Glucose 98      Alkaline Phosphatase 58      AST 41      ALT 47      Protein Total 7.4      Albumin 4.9      Bilirubin Total 0.2     ETHYL ALCOHOL LEVEL - Abnormal    Alcohol ethyl 0.27 (*)    URINE DRUG SCREEN PANEL - Abnormal    Amphetamines Urine Screen Negative      Barbituates Urine Screen Negative      Benzodiazepine Urine Screen Negative      Cannabinoids  Urine Screen Positive (*)     Cocaine Urine Screen Negative      Fentanyl Qual Urine Screen Negative      Opiates Urine Screen Negative      PCP Urine Screen Negative     MAGNESIUM - Normal    Magnesium 2.2     CBC WITH PLATELETS AND DIFFERENTIAL    WBC Count 7.7      RBC Count 5.04      Hemoglobin 16.3      Hematocrit 47.8      MCV 95      MCH 32.3      MCHC 34.1      RDW 13.5      Platelet Count 287      % Neutrophils 63      % Lymphocytes 26      % Monocytes 7      % Eosinophils 2      % Basophils 1      % Immature Granulocytes 1      NRBCs per 100 WBC 0      Absolute Neutrophils 4.9      Absolute Lymphocytes 2.0      Absolute Monocytes 0.6      Absolute Eosinophils 0.1      Absolute Basophils 0.1      Absolute Immature Granulocytes 0.1      Absolute NRBCs 0.0       No orders to display          Critical care was not performed.     Medical Decision Making  The patient's presentation was of high complexity (a chronic illness severe exacerbation, progression, or side effect of treatment).    The patient's evaluation involved:  review of external note(s) from 2 sources (review of ED visit April 2024 and ED visit January 2024 related to complications of alcoholism.)  review of 3+ test result(s) ordered prior to this encounter (labs from prior ED visit for alcohol related complaints reviewed)  ordering and/or review of 3+ test(s) in this encounter (see separate area of note for details)    The patient's management necessitated moderate risk (prescription drug management including medications given in the ED) and high risk (a decision regarding hospitalization).    Assessment & Plan    Patient with a history of polysubstance abuse and alcohol dependence presenting seeking detox from alcohol.  Has been drinking up to half a liter per day.  Presents with alcohol level 0.27.  No history of DTs or seizures but does develop symptomatic alcohol withdrawal and has been unable to stop in the community without assistance.  He  has a prior history of polysubstance abuse is still using cannabinoid beverages at times but sporadic.  Denies use of any other drugs and is no longer on Suboxone.  His urine drug screen is positive only for THC.  His labs show no significant metabolic abnormality.  He is an appropriate candidate for detox admission, is medically cleared for detox, and is voluntary.    I have reviewed the nursing notes. I have reviewed the findings, diagnosis, plan and need for follow up with the patient.    New Prescriptions    No medications on file       Final diagnoses:   Alcohol dependence with intoxication with complication (H)       Conor Schulz MD  [facility] EMERGENCY DEPARTMENT  7/5/2024        Conor Schulz MD  07/05/24 8534

## 2024-07-05 NOTE — PLAN OF CARE
"Goal Outcome Evaluation:    Plan of Care Reviewed With: patient        3AW Admission Note    S = Situation:   Shailesh Armendariz is a 33 year old year old male with a chief complaint of Alcohol Problem (Last drink this morning @ 0800.  Drinks 1/5 whiskey per day.  No history of seizures.  THC rare)    Voluntary admission to 3A for alcohol withdrawal and detox.    B  = Background:   Substance use history: THC, alcohol, cannabis  Mental health history: bipolar  Medical history: Asthma  Legal history: voluntary  Treatment history: once  Living situation: alone  Recent life stressors:\" I am thinking about it\"     A  =  Assessment:   During admission interview, pt affect was flat. Pt denies all mental health psych symptoms and contracted for safety. Oriented to the unit and he verbalized understanding.  MSSA 5  /79 (BP Location: Right arm, Patient Position: Sitting, Cuff Size: Adult Regular)   Pulse 106   Temp 98.2  F (36.8  C) (Oral)   Resp 18   Ht 1.778 m (5' 10\")   Wt 81.9 kg (180 lb 8 oz)   SpO2 94%   BMI 25.90 kg/m       R =   Request or Recommendation:   Alcohol withdrawal will be monitored and treated using MSSA with valium protocol.  Pt will meet with psychiatry, internal medicine, and case management tomorrow.  At the time of admission, pt reports discharge plan is to for treatment.             "

## 2024-07-05 NOTE — PROGRESS NOTES
07/05/24 1641   Patient Belongings   Did you bring any home meds/supplements to the hospital?  Yes   Disposition of meds  Sent to security/pharmacy per site process   Patient Belongings other (see comments)   Belongings Search Yes   Clothing Search Yes   Second Staff alison     Storage bin: belt, case of toiletries, jacket, hat, shoes, water bottle, plastic bag with bag of nicotine, cigarette making supplies, extra clothing; 2 books, letters,    Storage room: small green suitcase with  clothing, toiletries    Box in med room: phone, wallet    Security env # [id number]: [employer] card, discover card, 3 visa cards, [place] ebt card    Security env #  [id number]: meds    A             ADMISSION:    I am responsible for any personal items that are not sent to the safe or pharmacy. [facility] is not responsible for loss, theft or damage of any property in my possession.    Patient Signature _________________________________________ Date/Time _____________________    Staff Signature ___________________________________________ Date/Time _____________________    2nd Staff person, if patient is unable/unwilling to sign    ________________________________________________________ Date/Time _____________________    DISCHARGE:    All personal items have been returned to me.    Patient Signature _________________________________________ Date/Time _____________________    Staff Signature ___________________________________________ Date/Time _____________________

## 2024-07-05 NOTE — TELEPHONE ENCOUNTER
S: [facility] , Provider Zeus calling at 12:43 PM  with clinical on a 33 year old/Male presenting for alcohol detox.     B: Pt presents for ETOH detox.   Currently reports drinking a half liter of vodka for months and consumes THC drinks. Has hx of other drug use, no longer uses and no longer on suboxone.  Patient reports last use was this morning.  Pt KOSTAS: 0.27  Pt  denies hx of DT  Pt  denies hx of seizures. Last seizure: N/A  Pt endorsing the following symptoms of withdrawal:  None  MSSA Score: 5    Pt endorses acute mental health or medical concerns. Bipolar no concerns  Pt endorses other drug use: (!) CANNABIS PRODUCTS Amount/frequency:  Occasionally    Does Pt have a detox care plan in [facility]? No  Does pt present with specific needs, assistive devices, or exclusionary criteria? None  Is the patient ambulating, eating and drinking in the ED? Yes    A: Pt meets criteria to be presented for IP detox admission. Patient is voluntary    COVID Symptoms: No  If yes, COVID test required   Utox: Positive for Cannabinoids  Magnesium: WNL  CMP: Abnormalities: Sodium 146; Anion 16  CBC: WNL     R: Patient cleared and ready for behavioral bed placement: Yes    Pt is meeting criteria for presentation to 3A//[facility]    Does Patient need a Transfer Center request created? No, Pt is located within [facility] ED, [facility] ED, or [place] ED     12:55 PM: Intake paged Daniela to present Pt for 3A.    1:14 PM: Pt accepted to station 3A/[facility].    1:36 PM: Pt placed in queue and added to PPS admit board. Indicia completed.    1:38 PM: Intake called 3A CRN to inform that Pt is in queue. Admit to occur on next shift.    1:49 PM: Intake called [facility] ED and provided disposition to [facility].

## 2024-07-05 NOTE — PLAN OF CARE
"Problem: Adult Behavioral Health Plan of Care  Goal: Adheres to Safety Considerations for Self and Others  Outcome: Progressing    Patient is up and visible in the milieu. Patient is ambulating independently. Patient is alert and oriented x 4. Patient is attending/participating in unit programming. Pt is social with peers. Affect is blunted, mood is calm. Patient denies SI/SIB/HI. Pt denies auditory/visual hallucinations. Patient verbally contracts for safety on the unit.     This RN administered the PRN medications APAP, Trazodone, Valium (see MAR) at the request of the patient. Patient is tolerating medications well, denies any current side effects.     Pt's MSSA's = 4, 9 withdrawal assessment. Patient reports an improving appetite and adequate sleep. Patient discharge plans TBD. Rest, fluids, and food encouraged. Status 15 checks remain. Patient is able to make needs known appropriately. Patient denies any unmet needs at this time.     Blood pressure (!) 148/94, pulse 89, temperature 98.8  F (37.1  C), temperature source Oral, resp. rate 18, height 1.778 m (5' 10\"), weight 81.9 kg (180 lb 8 oz), SpO2 94%.      "

## 2024-07-06 PROCEDURE — 99254 IP/OBS CNSLTJ NEW/EST MOD 60: CPT

## 2024-07-06 PROCEDURE — 99222 1ST HOSP IP/OBS MODERATE 55: CPT | Performed by: PSYCHIATRY & NEUROLOGY

## 2024-07-06 PROCEDURE — 128N000004 HC R&B CD ADULT

## 2024-07-06 PROCEDURE — 250N000013 HC RX MED GY IP 250 OP 250 PS 637: Performed by: REGISTERED NURSE

## 2024-07-06 PROCEDURE — 250N000013 HC RX MED GY IP 250 OP 250 PS 637

## 2024-07-06 RX ORDER — NICOTINE 21 MG/24HR
1 PATCH, TRANSDERMAL 24 HOURS TRANSDERMAL DAILY
Status: DISCONTINUED | OUTPATIENT
Start: 2024-07-06 | End: 2024-07-09 | Stop reason: HOSPADM

## 2024-07-06 RX ORDER — VITAMIN B COMPLEX
1 TABLET ORAL DAILY
COMMUNITY

## 2024-07-06 RX ORDER — ALBUTEROL SULFATE 90 UG/1
1-2 AEROSOL, METERED RESPIRATORY (INHALATION) EVERY 6 HOURS PRN
Status: DISCONTINUED | OUTPATIENT
Start: 2024-07-06 | End: 2024-07-09 | Stop reason: HOSPADM

## 2024-07-06 RX ADMIN — TRAZODONE HYDROCHLORIDE 50 MG: 50 TABLET ORAL at 22:07

## 2024-07-06 RX ADMIN — NICOTINE 1 PATCH: 21 PATCH, EXTENDED RELEASE TRANSDERMAL at 16:33

## 2024-07-06 RX ADMIN — DIAZEPAM 10 MG: 5 TABLET ORAL at 12:17

## 2024-07-06 RX ADMIN — THIAMINE HCL TAB 100 MG 100 MG: 100 TAB at 08:47

## 2024-07-06 RX ADMIN — DIAZEPAM 10 MG: 5 TABLET ORAL at 20:15

## 2024-07-06 RX ADMIN — FOLIC ACID 1 MG: 1 TABLET ORAL at 08:47

## 2024-07-06 ASSESSMENT — ACTIVITIES OF DAILY LIVING (ADL)
ADLS_ACUITY_SCORE: 30
ORAL_HYGIENE: INDEPENDENT
ADLS_ACUITY_SCORE: 30
HYGIENE/GROOMING: INDEPENDENT
ADLS_ACUITY_SCORE: 30
LAUNDRY: WITH SUPERVISION
ADLS_ACUITY_SCORE: 30
HYGIENE/GROOMING: INDEPENDENT
DRESS: INDEPENDENT
DRESS: INDEPENDENT
ADLS_ACUITY_SCORE: 30
ADLS_ACUITY_SCORE: 30
ORAL_HYGIENE: INDEPENDENT
ADLS_ACUITY_SCORE: 30

## 2024-07-06 NOTE — PLAN OF CARE
"  Problem: Adult Behavioral Health Plan of Care  Goal: Adheres to Safety Considerations for Self and Others  Outcome: Progressing   Plan of Care Reviewed With: patient        Patient is up and visible in the milieu. Patient is ambulating independently. Patient is alert and oriented x 4. Patient is attending/participating in unit programming. Pt is social with peers. Affect is blunted, mood is calm. Patient denies SI/SIB/HI. Pt denies auditory/visual hallucinations. Patient verbally contracts for safety on the unit.     This RN administered the PRN medications Valium, Trazodone(see MAR) at the request of the patient. Patient is tolerating medications well, denies any current side effects.     Pt's MSSA's = 4, 8 withdrawal assessment. Patient reports a good appetite and adequate sleep with taking Trazodone at hs. Patient discharge plans door to door to [facility]. Rest, fluids, and food encouraged. Status 15 checks remain. Patient is able to make needs known appropriately. Patient denies any unmet needs at this time.     Blood pressure 129/82, pulse 87, temperature 98  F (36.7  C), temperature source Oral, resp. rate 16, height 1.778 m (5' 10\"), weight 81.9 kg (180 lb 8 oz), SpO2 96%.            "

## 2024-07-06 NOTE — PLAN OF CARE
Problem: Alcohol Withdrawal  Goal: Alcohol Withdrawal Symptom Control  Outcome: Progressing   Goal Outcome Evaluation:       Pt.scored 5 & 2 on MSSA. Denied acute alcohol withdrawal symptoms such as tremors and diaphoresis. No prn medication administered. He appeared asleep and comfortable through the night. Breathing was quiet and unlabored. Will continue to monitor.

## 2024-07-06 NOTE — H&P
Reason for admission:     Patient was admitted due to alcohol dependence      HPI:   33 year old male.    Patient states that he first started drinking at age 15 but his drinking became heavy over the past few years    Patient has had 4 prior treatments for drug dependence between 5-10 years ago. His drug of choice were opiates and methamphetamine. He has been clean from drugs for the past 5 years since his last treatment.    Patient has no DUI history. He has had a few blackouts. He has never had seizures. He has been drinking daily between 500 and 750 ml a day.    Patient is not treated for depression. He states that he had problems with depression in the past but denies problems with depression recently. He denies suicidal or homicidal thoughts.    Patient was diagnosed with bipolar while using drugs, but since he stopped using drugs he has not had problems with mood and has not been on medications for his mood.       According to ER report:  Shailesh Armendariz is a 33 year old male with history of bipolar disorder, alcohol use disorder, prior polysubstance use (meth, cannabinoids, opiates previously on Suboxone) who presents to the emergency department seeking alcohol detox. He has been drinking 1/5th to half a liter of vodka a day, last drank this morning. No history of withdrawal seizures or DTs. When he stops drinking he develops shakes, sweats, nausea and vomiting. He is planning on entering treatment 3 days from now. He also had THC drinks a couple days ago. He is off suboxone, denies other substance use. Denies psychiatric symptoms; no suicidal or homicidal ideation.    Patient with a history of polysubstance abuse and alcohol dependence presenting seeking detox from alcohol. Has been drinking up to half a liter per day. Presents with alcohol level 0.27. No history of DTs or seizures but does develop symptomatic alcohol withdrawal and has been unable to stop in the community without assistance. He has a  prior history of polysubstance abuse is still using cannabinoid beverages at times but sporadic. Denies use of any other drugs and is no longer on Suboxone. His urine drug screen is positive only for THC.   Conor Schulz MD  [facility] EMERGENCY DEPARTMENT  7/5/2024              Past Psychiatric History:   As above. He had one psychiatric admission 8 years ago for depression and suicidal thoughts          Substance Use and History:     As above        Past Medical History:   PAST MEDICAL HISTORY:   Past Medical History:   Diagnosis Date    Asthma     Bipolar disorder (H)     Uncomplicated asthma        PAST SURGICAL HISTORY: History reviewed. No pertinent surgical history.          Family History:   FAMILY HISTORY:   Family History   Problem Relation Age of Onset    Schizophrenia Paternal Grandfather     Schizophrenia Mother            Social History:   Please see the full psychosocial profile from the clinical treatment coordinator.   SOCIAL HISTORY:   Social History     Tobacco Use    Smoking status: Every Day     Current packs/day: 0.25     Average packs/day: 0.5 packs/day for 26.5 years (14.1 ttl pk-yrs)     Types: Cigarettes     Start date: 2008    Smokeless tobacco: Never   Substance Use Topics    Alcohol use: Yes     Comment: Alcoholic Drinks/ everyday     Patient is single and has been living with his father. He is on probation for disorderly conduct related to fight with his father.    He has no current romantic involvement. He is currently unemployed but was working recently.         PTA Medications:     Medications Prior to Admission   Medication Sig Dispense Refill Last Dose    acetaminophen (TYLENOL) 500 MG tablet Take 1,000 mg by mouth every 6 hours as needed for mild pain       albuterol (PROAIR HFA/PROVENTIL HFA/VENTOLIN HFA) 108 (90 BASE) MCG/ACT Inhaler Inhale 1-2 puffs into the lungs every 6 hours as needed for shortness of breath / dyspnea   7/3/2024    amoxicillin-clavulanate  (AUGMENTIN) 875-125 MG tablet Take 1 tablet by mouth 2 times daily 14 tablet 0     buprenorphine-naloxone (SUBOXONE) 8-2 MG SUBL sublingual tablet Place 20mg [facility] the tongue daily at [facility])       COCONUT OIL PO Take 1 capsule by mouth daily (unknown dose)       Cyanocobalamin (B-12 PO) Take 1 tablet by mouth daily (unknown dose)       fluticasone-salmeterol (ADVAIR) 500-50 MCG/DOSE diskus inhaler Inhale 1 puff into the lungs every 12 hours       ibuprofen (ADVIL/MOTRIN) 800 MG tablet Take 800 mg by mouth every 8 hours as needed for moderate pain       Multiple Vitamin (ONE-A-DAY MENS PO) Take 1 tablet by mouth daily       Multiple Vitamin (STRESS B PO) Take 1 tablet by mouth daily (unknown dose)       Omega-3 Fatty Acids (FISH OIL PO) Take 1 capsule by mouth daily (unknown dose)       Pyridoxine HCl (VITAMIN B6 PO) Take 1 tablet by mouth daily (unknown dose)       TURMERIC PO Take 1 capsule by mouth daily (unknown dose)               Current Medications:     Current Facility-Administered Medications   Medication Dose Route Frequency Provider Last Rate Last Admin    folic acid (FOLVITE) tablet 1 mg  1 mg Oral Daily Daniela Diamond APRN CNP   1 mg at 07/05/24 1616    nicotine (NICODERM CQ) 21 MG/24HR 24 hr patch 1 patch  1 patch Transdermal Once Conor Schulz MD   1 patch at 07/05/24 1208    thiamine (B-1) tablet 100 mg  100 mg Oral Daily Daniela Diamond APRN CNP   100 mg at 07/05/24 1616     Current Facility-Administered Medications   Medication Dose Route Frequency Provider Last Rate Last Admin    acetaminophen (TYLENOL) tablet 650 mg  650 mg Oral Q4H PRN Daniela Diamond APRN CNP   650 mg at 07/05/24 1837    alum & mag hydroxide-simethicone (MAALOX) suspension 30 mL  30 mL Oral Q4H PRN Daniela Diamond APRN CNP        atenolol (TENORMIN) tablet 50 mg  50 mg Oral Daily PRN Daniela Diamond APRN CNP        diazepam (VALIUM) tablet 5-20 mg  5-20 mg Oral Q30 Min PRN Daniela Diamond APRN CNP    10 mg at 07/05/24 2013    hydrOXYzine HCl (ATARAX) tablet 25 mg  25 mg Oral Q4H PRDaniela Jimenez APRN CNP        loperamide (IMODIUM) capsule 2 mg  2 mg Oral 4x Daily PRN Daniela Diamond APRN CNP        OLANZapine (zyPREXA) tablet 10 mg  10 mg Oral TID PRN Daniela Diamond APRN CNP        Or    OLANZapine (zyPREXA) injection 10 mg  10 mg Intramuscular TID PRN DiamondDaniela APRN CNP        ondansetron (ZOFRAN) tablet 4 mg  4 mg Oral Q6H PRN Diamond, Zako M, APRN CNP   4 mg at 07/05/24 1837    senna-docusate (SENOKOT-S/PERICOLACE) 8.6-50 MG per tablet 1 tablet  1 tablet Oral BID Daniela Jauregui APRN CNP        traZODone (DESYREL) tablet 50 mg  50 mg Oral At Bedtime PRN DiamondDaniela , APRN CNP   50 mg at 07/05/24 2129            Allergies:     Allergies   Allergen Reactions    Nickel Rash and Unknown          Labs:     Recent Results (from the past 72 hour(s))   Urine Drug Screen Panel    Collection Time: 07/05/24 11:29 AM   Result Value Ref Range    Amphetamines Urine Screen Negative Screen Negative    Barbituates Urine Screen Negative Screen Negative    Benzodiazepine Urine Screen Negative Screen Negative    Cannabinoids Urine Screen Positive (A) Screen Negative    Cocaine Urine Screen Negative Screen Negative    Fentanyl Qual Urine Screen Negative Screen Negative    Opiates Urine Screen Negative Screen Negative    PCP Urine Screen Negative Screen Negative   Comprehensive metabolic panel    Collection Time: 07/05/24 11:36 AM   Result Value Ref Range    Sodium 146 (H) 135 - 145 mmol/L    Potassium 4.2 3.4 - 5.3 mmol/L    Carbon Dioxide (CO2) 27 22 - 29 mmol/L    Anion Gap 16 (H) 7 - 15 mmol/L    Urea Nitrogen 9.3 6.0 - 20.0 mg/dL    Creatinine 0.89 0.67 - 1.17 mg/dL    GFR Estimate >90 >60 mL/min/1.73m2    Calcium 9.8 8.6 - 10.0 mg/dL    Chloride 103 98 - 107 mmol/L    Glucose 98 70 - 99 mg/dL    Alkaline Phosphatase 58 40 - 150 U/L    AST 41 0 - 45 U/L    ALT 47 0 - 70 U/L    Protein Total 7.4 6.4 -  "8.3 g/dL    Albumin 4.9 3.5 - 5.2 g/dL    Bilirubin Total 0.2 <=1.2 mg/dL   Magnesium    Collection Time: 07/05/24 11:36 AM   Result Value Ref Range    Magnesium 2.2 1.7 - 2.3 mg/dL   Ethyl Alcohol Level    Collection Time: 07/05/24 11:36 AM   Result Value Ref Range    Alcohol ethyl 0.27 (H) <=0.01 g/dL   CBC with platelets and differential    Collection Time: 07/05/24 11:36 AM   Result Value Ref Range    WBC Count 7.7 4.0 - 11.0 10e3/uL    RBC Count 5.04 4.40 - 5.90 10e6/uL    Hemoglobin 16.3 13.3 - 17.7 g/dL    Hematocrit 47.8 40.0 - 53.0 %    MCV 95 78 - 100 fL    MCH 32.3 26.5 - 33.0 pg    MCHC 34.1 31.5 - 36.5 g/dL    RDW 13.5 10.0 - 15.0 %    Platelet Count 287 150 - 450 10e3/uL    % Neutrophils 63 %    % Lymphocytes 26 %    % Monocytes 7 %    % Eosinophils 2 %    % Basophils 1 %    % Immature Granulocytes 1 %    NRBCs per 100 WBC 0 <1 /100    Absolute Neutrophils 4.9 1.6 - 8.3 10e3/uL    Absolute Lymphocytes 2.0 0.8 - 5.3 10e3/uL    Absolute Monocytes 0.6 0.0 - 1.3 10e3/uL    Absolute Eosinophils 0.1 0.0 - 0.7 10e3/uL    Absolute Basophils 0.1 0.0 - 0.2 10e3/uL    Absolute Immature Granulocytes 0.1 <=0.4 10e3/uL    Absolute NRBCs 0.0 10e3/uL          Physical Exam:     /84   Pulse 75   Temp 98.1  F (36.7  C) (Oral)   Resp 16   Ht 1.778 m (5' 10\")   Wt 81.9 kg (180 lb 8 oz)   SpO2 95%   BMI 25.90 kg/m    Weight is 180 lbs 8 oz  Body mass index is 25.9 kg/m .    Physical Exam:  Gen: No acute distress  Skin: No diaphoresis or rash  Neuro: No abnormal movements         Physical ROS:    The remainder of 10-point review of systems was negative except as noted in HPI.             Mental Status Exam:     Mental Status  Patient is casually dressed  Hygiene good  Speech fluent  Thought Process logical  Thought Content:  No suicidal ideation,    No homicidal ideation,   No ideas of reference,    No loose associations,    No auditory hallucinations,     No visual hallucinations   No delusions  Psychomotor: " No agitation or slowing  Cognition:  Alert and oriented to time place and person  Attention good  Concentration good  Memory normal including recent and remote memory  Mood:  euthymic  Affect: mood congruent  Judgement: normal  Eye contact good  Cooperation good  Language normal  Fund of knowledge normal  Musculoskeletal normal gait with no abnormal movements         Diagnoses:   Alcohol dependence with intoxication with complication (H)    Patient Active Problem List   Diagnosis    Severe opioid use disorder (H)    Severe amphetamine substance use disorder (H)    Anxiety    Severe cannabis use disorder (H)    Neuroleptic consent form discussed and signed: January 4, 2018    Substance-induced psychotic disorder (H)    Bipolar mixed affective disorder, moderate (H)    Suicidal ideation    Psychosis, atypical (H)    Allergic rhinitis due to pollen    Asthma, unspecified asthma severity, unspecified whether complicated, unspecified whether persistent    Other diseases of nasal cavity and sinuses    Benign neoplasm of skin    History of opioid abuse (H)    Major depressive disorder, single episode, unspecified    Moderate persistent asthma in adult without complication    Nerve pain    Nonsuppurative otitis media    Substance use disorder    Alcohol dependence with intoxication with complication (H)              Assessment:     Patient presents for alcohol detox         Plan:     Medication:  Valium  detox protocol    Consults: Hospitalist will be consulted if medical issues arise      Multidisciplinary Interventions:  to gather collateral information, coordinate care with outpatient providers and begin follow up planning      Disposition: Sober housing, CD treatment        More than 40 minutes spent on this visit with more than 50% time spent on coordination of care with staff, reviewing medical record, psychoeducation, providing supportive therapy regarding coping with chronic mental illness, entering  orders and preparing documentation for the visit    Sriram Mishra MD

## 2024-07-06 NOTE — CONSULTS
[facility] [place]  Consult Note - Hospitalist Service  Date of Admission:  7/5/2024  Consult Requested by: Daniela Diamond APRN CNP   Reason for Consult: Medical co-management in detox    Assessment & Plan   Shailesh Armendariz is a 33 year old male admitted on 7/5/2024.for alcohol detox. He has a past medical history of alcohol use disorder, tobacco use disorder,  opiate use disorder previously on suboxone, bipolar disorder, asthma, . Medicine has been consulted for H&P during detox.    Medicine will sign off as there are not acute issues.     # Acute alcohol withdrawal:   # Polysubstance use disorder (alcohol, cannabinoids):   Presents seeking detox from alcohol.Last drink on 7/5 at 8 AM. Drinks 500-750 mL of whiskey per day.  Uses THC rarely in beverage form. No history of seizures or DTs. When he stops drinking he gets shakes, sweats, and N/V. Blood alcohol level of 0.27.Utox with cannabinoids. Alk phos WNL.Total bili WNL. AST: ALT WNL. MSSA score of 3-9 since arrival. Feels like his shaking and and stomach ache has improved.   - Management per Psychiatry  - No need for BMP/hepatic panel unless clinically indicated   - Agree with MSSA protocol  - Agree with MVI, folate, thiamine     #Tobacco use disorder:   - Nicotine patch and as needed gum/lozenges lozenges    # History of opiate use disorder on previously on Suboxone, resolved:  Last use of Suboxone was last spring.  - Monitor as clinically indicated    # Depression:  # PTSD:   Denies any mental health symptoms/SI/HI. Previously dx of bipolar disorder, but since he stopped   - Per Psychiatry     # Asthma, mild intermittant:   Previously on Advair 500-50 mcg BID, albuterol PRN no longer needed. No wheezing reported or noted on physical exam.   -PRN albuterol     # Hypernatremia:   Upon admission, Na = 146 mmol/L. Likely due to poor PO intake.  - Monitor as clinically indicated    # HAGMA:   16 upon admission.  Likely due to  "EtOH usage and poor oral intake.  - Monitor as clinically indicated     The patient's care was discussed with the Bedside Nurse, Patient, and Primary team (per     Clinically Significant Risk Factors Present on Admission                   # Overweight: Estimated body mass index is 25.9 kg/m  as calculated from the following:    Height as of this encounter: 1.778 m (5' 10\").    Weight as of this encounter: 81.9 kg (180 lb 8 oz).       # Asthma: noted on problem list        [facility]  Hospitalist Service  Securely message with Vocera (more info)  Text page via [facility] Paging/Directory     This chart documentation was completed with Dragon voice-recognition software. Even though reviewed, this chart may still contain some grammatical, spelling, and word errors. Please contact the author for any questions or clarifications.     ______________________________________________________________________    Chief Complaint   Acute alcohol withdrawal   Polysubstance use disorder     History is obtained from the patient    History of Present Illness   Shailesh Armendariz is a 33 year old male admitted on 7/5/2024.for alcohol detox. He has a past medical history of alcohol use disorder, tobacco use disorder,  opiate use disorder previously on suboxone, bipolar disorder, asthma, . Medicine has been consulted for H&P during detox.    Presents seeking detox from alcohol.Last drink on 7/5 at 8 AM. Drinks 500-750 mL of whiskey per day.  Uses THC rarely in beverage form. No history of seizures or DTs. When he stops drinking he gets shakes, sweats, and N/V. Blood alcohol level of 0.27.Utox with cannabinoids. Alk phos WNL.Total bili WNL. AST: ALT WNL. MSSA score of 3-9 since arrival. Feels like his shaking and and stomach ache has improved.     Past Medical History    Past Medical History:   Diagnosis Date    Asthma     Bipolar disorder (H)     Uncomplicated asthma        Past Surgical History   History reviewed. No pertinent " surgical history.    Medications   I have reviewed this patient's current medications  Medications Prior to Admission   Medication Sig Dispense Refill Last Dose    acetaminophen (TYLENOL) 500 MG tablet Take 1,000 mg by mouth every 6 hours as needed for mild pain       albuterol (PROAIR HFA/PROVENTIL HFA/VENTOLIN HFA) 108 (90 BASE) MCG/ACT Inhaler Inhale 1-2 puffs into the lungs every 6 hours as needed for shortness of breath / dyspnea   7/3/2024    amoxicillin-clavulanate (AUGMENTIN) 875-125 MG tablet Take 1 tablet by mouth 2 times daily 14 tablet 0     buprenorphine-naloxone (SUBOXONE) 8-2 MG SUBL sublingual tablet Place 20mg under the tongue daily at [facility]       COCONUT OIL PO Take 1 capsule by mouth daily (unknown dose)       Cyanocobalamin (B-12 PO) Take 1 tablet by mouth daily (unknown dose)       fluticasone-salmeterol (ADVAIR) 500-50 MCG/DOSE diskus inhaler Inhale 1 puff into the lungs every 12 hours       ibuprofen (ADVIL/MOTRIN) 800 MG tablet Take 800 mg by mouth every 8 hours as needed for moderate pain       Multiple Vitamin (ONE-A-DAY MENS PO) Take 1 tablet by mouth daily       Multiple Vitamin (STRESS B PO) Take 1 tablet by mouth daily (unknown dose)       Omega-3 Fatty Acids (FISH OIL PO) Take 1 capsule by mouth daily (unknown dose)       Pyridoxine HCl (VITAMIN B6 PO) Take 1 tablet by mouth daily (unknown dose)       TURMERIC PO Take 1 capsule by mouth daily (unknown dose)           Review of Systems    The 10 point Review of Systems is negative other than noted in the HPI or here.     Social History   I have reviewed this patient's social history and updated it with pertinent information if needed.  Social History     Tobacco Use    Smoking status: Every Day     Current packs/day: 0.25     Average packs/day: 0.5 packs/day for 26.5 years (14.1 ttl pk-yrs)     Types: Cigarettes     Start date: 2008    Smokeless tobacco: Never   Substance Use Topics    Alcohol use: Yes     Comment:  Alcoholic Drinks/ everyday    Drug use: Not Currently     Allergies   Allergies   Allergen Reactions    Nickel Rash and Unknown        Physical Exam   Vital Signs: Temp: 98.1  F (36.7  C) Temp src: Oral BP: 119/84 Pulse: 75   Resp: 16 SpO2: 95 % O2 Device: None (Room air)    Weight: 180 lbs 8 oz    General Appearance: In NAD, sitting in bed  Respiratory: LS clear b/l, normal RR  Cardiovascular: S1, S2, no m/r/g  GI: BS+, all 4 quadrants, no masses, non-tender upon palpation  Skin: Intact on face, arms, legs. No wounds, bruising, or lesions noted.  Neuropsych:A&Ox4, moving all extremities, pleasant     Medical Decision Making       60 MINUTES SPENT BY ME on the date of service doing chart review, history, exam, documentation & further activities per the note.      Data     I have personally reviewed the following data over the past 24 hrs:    7.7  \   16.3   / 287     146 (H) 103 9.3 /  98   4.2 27 0.89 \     ALT: 47 AST: 41 AP: 58 TBILI: 0.2   ALB: 4.9 TOT PROTEIN: 7.4 LIPASE: N/A       Imaging results reviewed over the past 24 hrs:   No results found for this or any previous visit (from the past 24 hour(s)).

## 2024-07-06 NOTE — PHARMACY-ADMISSION MEDICATION HISTORY
Pharmacist Admission Medication History    Admission medication history is complete. The information provided in this note is only as accurate as the sources available at the time of the update.    Medication reconciliation/reorder completed by provider prior to medication history? No    Information Source(s): Patient and Patient's pharmacy via in-person    Pertinent Information: Discussed medication history with patient who reported to not be taking any medications outside of occasional ibuprofen and acetaminophen. Patient also reported taking multivitamin and various supplements.     Changes made to PTA medication list:  Added: NAC, vitamin D3  Deleted: albuterol, Augmentin, suboxone, advair, duplicate multivitamin  Changed: None    Allergies reviewed with patient and updates made in EHR: yes    Medication History Completed By: GREER SAWANT [facility] 7/6/2024 2:17 PM    Prior to Admission medications    Medication Sig Last Dose Taking? Auth Provider Long Term End Date   acetaminophen (TYLENOL) 500 MG tablet Take 1,000 mg by mouth every 6 hours as needed for mild pain Past Month Yes Unknown, Entered By History     COCONUT OIL PO Take 1 capsule by mouth daily (unknown dose) Past Week Yes Unknown, Entered By History     Cyanocobalamin (B-12 PO) Take 1 tablet by mouth daily (unknown dose) Past Week Yes Unknown, Entered By History     ibuprofen (ADVIL/MOTRIN) 800 MG tablet Take 800 mg by mouth every 8 hours as needed for moderate pain Past Month Yes Unknown, Entered By History     N-ACETYL CYSTEINE PO Take 1 tablet by mouth daily Past Week Yes Unknown, Entered By History     Omega-3 Fatty Acids (FISH OIL PO) Take 1 capsule by mouth daily (unknown dose) Past Week Yes Unknown, Entered By History     Pyridoxine HCl (VITAMIN B6 PO) Take 1 tablet by mouth daily (unknown dose) Past Week Yes Unknown, Entered By History     TURMERIC PO Take 1 capsule by mouth daily (unknown dose) Past Week Yes Unknown, Entered By History      Vitamin D3 (CHOLECALCIFEROL) 25 mcg (1000 units) tablet Take 1 tablet by mouth daily Past Week Yes Unknown, Entered By History     Multiple Vitamin (ONE-A-DAY MENS PO) Take 1 tablet by mouth daily   Unknown, Entered By History

## 2024-07-06 NOTE — PLAN OF CARE
"Goal Outcome Evaluation:    Plan of Care Reviewed With: patient      Patient is up and visible in the milieu. Patient is ambulating independently, balanced and steady. Patient is alert and oriented x 4. Patient is attending/participating in unit programming. Pt is social with peers. Affect is blunted/flat, mood is calm. Patient rates anxiety a 2/10 and depression a 0/10 on the 1-10 severity scale. Patient denies SI/SIB/HI. Pt denies auditory/visual hallucinations. Patient verbally contracts for safety on the unit. Patient is tolerating medications well, denies any current side effects.     Pt's MSSA's = 3 and 8 this shift. Patient medicated with 10mg valium x 1 (See MAR) per MSSA protocol for the score of 8. Patient is tremulous. Patient reports a good appetite, and good sleep. Patient discharge plans pending at this time. Rest, fluids, and food encouraged. Status 15 checks remain. Patient is able to make needs known appropriately. Patient denies any unmet needs at this time.     Blood pressure (!) 143/87, pulse 75, temperature 97.5  F (36.4  C), temperature source Temporal, resp. rate 16, height 1.778 m (5' 10\"), weight 81.9 kg (180 lb 8 oz), SpO2 95%.            "

## 2024-07-06 NOTE — PLAN OF CARE
Rehab Group    Start time: 1630  End time: 1715  Patient time total: 35 minutes    attended partial group    #7 attended   Group Type: occupational therapy   Group Topic Covered: cognitive activities, coping skills, healthy leisure time, problem solving, and social skills   Group Session Detail:  Patient engaged in a leisure activity with a visuospatial and cognitive component in order to promote: problem solving skills, improve attention and focus, following directions,  emphasize new learning, exercise cognitive skills, and foster leisure and healthy distraction, symptom management.   Patient Response/Contribution:  cooperative with task, socially appropriate, and actively engaged   Patient Detail:pt polite and appropriate during interactions. Pt shared he enjoys reading for his cognitive wellness. Pt was independent with task following initial instructions and demonstration. Pt worked quietly for duration of engagement.        No Charge    Patient Active Problem List   Diagnosis    Severe opioid use disorder (H)    Severe amphetamine substance use disorder (H)    Anxiety    Severe cannabis use disorder (H)    Neuroleptic consent form discussed and signed: January 4, 2018    Substance-induced psychotic disorder (H)    Bipolar mixed affective disorder, moderate (H)    Suicidal ideation    Psychosis, atypical (H)    Allergic rhinitis due to pollen    Asthma, unspecified asthma severity, unspecified whether complicated, unspecified whether persistent    Other diseases of nasal cavity and sinuses    Benign neoplasm of skin    History of opioid abuse (H)    Major depressive disorder, single episode, unspecified    Moderate persistent asthma in adult without complication    Nerve pain    Nonsuppurative otitis media    Substance use disorder    Alcohol dependence with intoxication with complication (H)

## 2024-07-07 PROCEDURE — 90853 GROUP PSYCHOTHERAPY: CPT

## 2024-07-07 PROCEDURE — 128N000004 HC R&B CD ADULT

## 2024-07-07 PROCEDURE — 250N000013 HC RX MED GY IP 250 OP 250 PS 637: Performed by: REGISTERED NURSE

## 2024-07-07 PROCEDURE — 250N000013 HC RX MED GY IP 250 OP 250 PS 637

## 2024-07-07 PROCEDURE — 90853 GROUP PSYCHOTHERAPY: CPT | Performed by: PSYCHOLOGIST

## 2024-07-07 RX ADMIN — FOLIC ACID 1 MG: 1 TABLET ORAL at 08:45

## 2024-07-07 RX ADMIN — TRAZODONE HYDROCHLORIDE 50 MG: 50 TABLET ORAL at 22:06

## 2024-07-07 RX ADMIN — THIAMINE HCL TAB 100 MG 100 MG: 100 TAB at 08:45

## 2024-07-07 RX ADMIN — NICOTINE 1 PATCH: 21 PATCH, EXTENDED RELEASE TRANSDERMAL at 08:45

## 2024-07-07 ASSESSMENT — ACTIVITIES OF DAILY LIVING (ADL)
ADLS_ACUITY_SCORE: 30
HYGIENE/GROOMING: INDEPENDENT
ADLS_ACUITY_SCORE: 30
DRESS: INDEPENDENT
ORAL_HYGIENE: INDEPENDENT
ADLS_ACUITY_SCORE: 30
ADLS_ACUITY_SCORE: 30
HYGIENE/GROOMING: INDEPENDENT
ADLS_ACUITY_SCORE: 30
DRESS: INDEPENDENT
LAUNDRY: WITH SUPERVISION
ADLS_ACUITY_SCORE: 30
ORAL_HYGIENE: INDEPENDENT

## 2024-07-07 NOTE — PLAN OF CARE
Problem: Alcohol Withdrawal  Goal: Alcohol Withdrawal Symptom Control  Outcome: Progressing   Goal Outcome Evaluation:         Pt.continued on acute alcohol withdrawal monitoring through the night. MSSA scores were 7 & 5. No prn medication administered. Pt.appeared asleep and comfortable through the night. Breathing was quiet and spontaneous. No safety or behavioral concerns observed or reported. Will continue to monitor.

## 2024-07-07 NOTE — PLAN OF CARE
Group Attendance:  attended full group    Time session began: 1515  Time session ended: 1413  Patient's total time in group: 58    Total # Attendees   5   Group Type psychotherapeutic     Group Topic Covered emotional regulation, symptom management, and incorporating skill sets  Effectiveness: focus on what works, Problem solving emotions      Group Session Detail This group focused on healthy tools for good emotional and behavioral health outcomes (addressing mental health and substance use disorders). Reviewed sources of triggers to substance use, especially environmental factors, and ways to address them. Identified the roles of stigma in help seeking and in healthy choices and behaviors. Reviewed ways to address stigma, effectively. Patients discussed their experiences and ways they have tried to address their needs around the subjects. Facilitator provided guidance and redirection where appropriate.      Patient's response to the group topic/interactions:  positive affect, cooperative with task, and expressed readiness to alter behaviors.     Patient Details: This patient attended the group and reported that it was very helpful.           77745 - Group psychotherapy - 1 Session    Patient Active Problem List   Diagnosis    Severe opioid use disorder (H)    Severe amphetamine substance use disorder (H)    Anxiety    Severe cannabis use disorder (H)    Neuroleptic consent form discussed and signed: January 4, 2018    Substance-induced psychotic disorder (H)    Bipolar mixed affective disorder, moderate (H)    Suicidal ideation    Psychosis, atypical (H)    Allergic rhinitis due to pollen    Asthma, unspecified asthma severity, unspecified whether complicated, unspecified whether persistent    Other diseases of nasal cavity and sinuses    Benign neoplasm of skin    History of opioid abuse (H)    Major depressive disorder, single episode, unspecified    Moderate persistent asthma in adult without complication     Nerve pain    Nonsuppurative otitis media    Substance use disorder    Alcohol dependence with intoxication with complication (H)     Eric Monroy, Ph.D., [facility]

## 2024-07-07 NOTE — PLAN OF CARE
"  Problem: Adult Behavioral Health Plan of Care  Goal: Adheres to Safety Considerations for Self and Others  Outcome: Progressing    Patient is up and visible in the milieu, but does spend quite a bit of time in his room alone. Patient is ambulating independently. Patient is alert and oriented x 4. Patient is attending/participating in unit programming. Pt is social with select peers. Affect is blunted, mood is calm. Patient denies SI/SIB/HI. Pt denies auditory/visual hallucinations. Patient verbally contracts for safety on the unit.   Pt endorses some loose stools the last few days, but has declined intervention when asked.  Pt is now OOD.    This RN administered the PRN medications Trazodone, (see MAR) at the request of the patient. Patient is tolerating medications well, denies any current side effects.     Pt's MSSA's = 6, 3 withdrawal assessment. Patient reports a good appetite and adequate sleep with prn Trazodone. Patient discharge plans are -hoping for door to door to [facility], he has been there before and liked it. Rest, fluids, and food encouraged. Status 15 checks remain. Patient is able to make needs known appropriately. Patient denies any unmet needs at this time.     Blood pressure 114/88, pulse 101, temperature 97.1  F (36.2  C), temperature source Temporal, resp. rate 16, height 1.778 m (5' 10\"), weight 81.9 kg (180 lb 8 oz), SpO2 96%.      "

## 2024-07-07 NOTE — PLAN OF CARE
"Goal Outcome Evaluation:    Plan of Care Reviewed With: patient      Patient is up and visible in the milieu. Patient is ambulating independently, balanced and steady. Patient is alert and oriented x 4. Patient is attending/participating in unit programming. Pt is social with peers. Affect is full-range, mood is calm. Patient denies anxiety and depression this morning. Patient denies SI/SIB/HI. Pt denies auditory/visual hallucinations. Patient verbally contracts for safety on the unit. Patient is tolerating medications well, denies any current side effects.     Pt's MSSA's = 6 and 5. Patient reports a good appetite, and good sleep. Patient discharge plans pending, but patient is requesting door to door transfer upon discharge. Rest, fluids, and food encouraged. Status 15 checks remain. Patient is able to make needs known appropriately. Patient denies any unmet needs at this time.     Blood pressure 136/85, pulse 80, temperature 97.4  F (36.3  C), temperature source Temporal, resp. rate 16, height 1.778 m (5' 10\"), weight 81.9 kg (180 lb 8 oz), SpO2 96%.       "

## 2024-07-07 NOTE — PLAN OF CARE
[address]     Case Management Encounter:   Met with pt. He reports he has a bed at [facility] when ever he is ready to discharge. Confirms that [facility] asked him to complete detox prior to admit there    Insurance:   Payor: [place]ICA[place] / Plan: [place] / Product Type: Medicaid /     Legal Status:   Orders Placed This Encounter      Voluntary     SUDs Assessment Status:   Not needed     ROIs on file:  Will need for [facility] if coordinating direct discharge.     Living Situation:   Lives with father     Current Providers, Supports & Collateral:    LORRI GERARDO (Father) [phone] (Mobile) No YOHANNES    Current Plan/Referral Status:   Has placement at [facility] already.    RN updated.    Donna Henao, [facility], [address] - Adult Inpatient Addiction Psychiatry Unit

## 2024-07-08 PROCEDURE — 128N000004 HC R&B CD ADULT

## 2024-07-08 PROCEDURE — 99233 SBSQ HOSP IP/OBS HIGH 50: CPT | Performed by: PSYCHIATRY & NEUROLOGY

## 2024-07-08 PROCEDURE — 250N000013 HC RX MED GY IP 250 OP 250 PS 637

## 2024-07-08 PROCEDURE — 250N000013 HC RX MED GY IP 250 OP 250 PS 637: Performed by: REGISTERED NURSE

## 2024-07-08 RX ORDER — ALBUTEROL SULFATE 90 UG/1
1-2 AEROSOL, METERED RESPIRATORY (INHALATION) EVERY 6 HOURS PRN
COMMUNITY
Start: 2024-07-08

## 2024-07-08 RX ADMIN — NICOTINE 1 PATCH: 21 PATCH, EXTENDED RELEASE TRANSDERMAL at 08:29

## 2024-07-08 RX ADMIN — THIAMINE HCL TAB 100 MG 100 MG: 100 TAB at 08:29

## 2024-07-08 RX ADMIN — FOLIC ACID 1 MG: 1 TABLET ORAL at 08:28

## 2024-07-08 RX ADMIN — TRAZODONE HYDROCHLORIDE 50 MG: 50 TABLET ORAL at 22:08

## 2024-07-08 ASSESSMENT — ACTIVITIES OF DAILY LIVING (ADL)
ADLS_ACUITY_SCORE: 30
ORAL_HYGIENE: INDEPENDENT
ADLS_ACUITY_SCORE: 30
ADLS_ACUITY_SCORE: 30
LAUNDRY: WITH SUPERVISION
ADLS_ACUITY_SCORE: 30
HYGIENE/GROOMING: INDEPENDENT
ADLS_ACUITY_SCORE: 30
ADLS_ACUITY_SCORE: 30
DRESS: INDEPENDENT
ADLS_ACUITY_SCORE: 30
ORAL_HYGIENE: INDEPENDENT
ADLS_ACUITY_SCORE: 30
ADLS_ACUITY_SCORE: 30
DRESS: INDEPENDENT
ADLS_ACUITY_SCORE: 30
LAUNDRY: WITH SUPERVISION
ADLS_ACUITY_SCORE: 30
ADLS_ACUITY_SCORE: 30
HYGIENE/GROOMING: INDEPENDENT
ADLS_ACUITY_SCORE: 30

## 2024-07-08 ASSESSMENT — ANXIETY QUESTIONNAIRES
5. BEING SO RESTLESS THAT IT IS HARD TO SIT STILL: NOT AT ALL
GAD7 TOTAL SCORE: 5
4. TROUBLE RELAXING: SEVERAL DAYS
GAD7 TOTAL SCORE: 5
IF YOU CHECKED OFF ANY PROBLEMS ON THIS QUESTIONNAIRE, HOW DIFFICULT HAVE THESE PROBLEMS MADE IT FOR YOU TO DO YOUR WORK, TAKE CARE OF THINGS AT HOME, OR GET ALONG WITH OTHER PEOPLE: SOMEWHAT DIFFICULT
7. FEELING AFRAID AS IF SOMETHING AWFUL MIGHT HAPPEN: NOT AT ALL
3. WORRYING TOO MUCH ABOUT DIFFERENT THINGS: SEVERAL DAYS
1. FEELING NERVOUS, ANXIOUS, OR ON EDGE: SEVERAL DAYS
6. BECOMING EASILY ANNOYED OR IRRITABLE: SEVERAL DAYS
2. NOT BEING ABLE TO STOP OR CONTROL WORRYING: SEVERAL DAYS

## 2024-07-08 NOTE — DISCHARGE INSTRUCTIONS
Behavioral Discharge Planning and Instructions  THANK YOU FOR CHOOSING [facility]  3AW  [phone]    Summary: You were admitted to [facility] on 7/9 for detoxification from alcohol.  A medical exam was performed that included lab work. You have met with a  and opted to admit to [facility].  Please take care and make your recovery a daily priority, Shailesh!  It was a pleasure working with you and the entire treatment team here wishes you the very best in your recovery!     Recommendation:  Admit to [facility] and follow their recommendations. Return to [place] if a higher level of care is needed.     Main Diagnoses:  Per Dr. Payton Welch MD;  Alcohol use disorder, severe, dependence with withdrawal with complication   Polysubstance use disorder including opiates and stimulants  Hx of mood disorder  Nicotine dependence with withdrawal   Asthma mild, intermittent  Hypernatremia  HAGMA      Major Treatments, Procedures and Findings:  You were treated for Alcohol  detoxification using valium. You completed a chemical dependency assessment. You had labs drawn and those results were reviewed with you. Please take a copy of your lab work with you to your next primary care provider appointment.    Symptoms to Report:  If you experience more anxiety, confusion, sleeplessness, deep sadness or thoughts of suicide, notify your treatment team or notify your primary care provider. IF ANY OF THE SYMPTOMS YOU ARE EXPERIENCING ARE A MEDICAL EMERGENCY CALL 911 IMMEDIATELY.     Lifestyle Adjustment: Adjust your lifestyle to get enough sleep, relaxation, exercise and good nutrition. Continue to develop healthy coping skills to decrease stress and promote a sober living environment. Do not use mood altering substances including alcohol, illegal drugs or addictive medications other than what is currently prescribed.     Disposition: [facility]  [address], [place]  [address]   [address]  [phone]    Facts about COVID19 at www.cdc.gov/COVID19 and www.[place].gov/covid19    Keeping hands clean is one of the most important steps we can take to avoid getting sick and spreading germs to others.  Please wash your hands frequently and lather with soap for at least 20 seconds!    Follow-up Appointment:   You are aware you should make a follow up appointment with your primary care doctor for medical and medication management   The patient is seeking inpatient treatment and will work with his care team to schedule therapy and psychiatry appointments.        Recovery apps for your phone for educational purposes and to locate in person and zoom recovery meetings  Everything AA - educational purpose and derrell is a great resource  12 Step Toolkit - educational purpose learning about the 12 steps to recovery  [employer] - meeting derrell  AA  - meeting derrell  Meeting guide - meeting derrell  Quick NA meeting - meeting derrell  [employer]- has various apps    Resources:  Some AA/NA meetings are being held online however most have returned to in-person or a hybrid combination please check online to verify time.  Need Support Now? If you or someone you know is struggling or in crisis, help is available. Call or text [employer]8 or chat [employer]  AA meetings search for them at: https://aa-intergroup.org (worldwide meeting listings)  AA meetings for [place] area can be found online at: https://a[place].org (click local online meetings listings)  NA meetings for [place] area can be found online at: https://www.nam[place].org  (click find a meeting)  AA and NA Sponsors are excellent resources for support and you can find one at any support group meeting.   Alcoholics Anonymous (https://aa.org/): for information 24 hours/day  AA Intergroup service office in [place] (http://www.aastpa[place].org/) 978.202.2839  AA Intergroup service office in [facility]: 369.322.4274. (http://www.a[place].org/)  Narcotics  "Anonymous (www.nam[place].org) (866) 855-7669  https://aafairviewriverside.org/meetings  SMART Recovery - self management for addiction recovery:  www.smartrecovery.org  Pathways ~ A Health Crisis Resource & Support Center:  469.562.3602.  https://prescribetoprevent.org/patient-education/videos/  http://www.harmreduction.org  Crisis Text Line  Text 144099  You will be connected with a trained live crisis counselor to provide support. Por espanol, texto  TJ a 855953 o texto a 442-AYUDAME en WhatsApp  [facility] Line  1.300.SUICIDE [0225790]  [facility] 479-894-8952  Support Group:  AA/ZURDO and Sponsor/support.  Fast Tracker  Linking people to mental health and substance use disorder resources  "[employer]   [place] Mental Health Warm Line  Peer to peer support  Monday thru Saturday, 12 pm to 10 pm  809.845.2610 or 7.449.624.7083  Text \"Support\" to 63055  National [place] on Mental Illness (JEMIMA)  116.938.1922 or 1888.JEMIMA.HELPS  Alcoholics Anonymous (www.alcoholics-anonymous.org): Check your phone book for your local chapter.  Suicide Awareness Voices of Education (SAVE) (www.save.org): 805-527-HYTQ (7283)  National Suicide Prevention Line (www.mentalhealthmn.org): 443-348-QUGZ (1501)  Mental Health Consumer/Survivor Network of [place] (www.mhcsn.net): 480.607.7872 or 057-096-0353  Mental Health Association of [place] (www.mentalhealth.org): 445.748.2636 or 389-788-7916   Substance Abuse and Mental Health Services (www.samhsa.gov)  [place] Opioid Prevention Coalition: www.opioidcoalition.org    [place] Recovery Connection (MRC)  [facility] connects people seeking recovery to resources that help foster and sustain long-term recovery.  Whether you are seeking resources for treatment, transportation, housing, job training, education, health care or other pathways to recovery, [facility] is a great place to start.  588.376.9454.  www.[facility].org    Great Pod casts for nutrition and " wellness  Listen on Apple Podcasts  Dishing Up Nutrition   N[employer]rition[employer] Well[employer]   Nutrition       Understand the connection between what you eat and how you feel. Hosted by licensed nutritionists and dietitians from [employer] we share practical, real-life solutions for healthier living through nutrition.     General Medication Instructions:   See your medication sheet(s) for instructions.   Take all medications as prescribed.  Make no changes unless your primary care provider suggests them.   Go to all your primary care provider visits.  Be sure to have all your required lab tests. This way, your medicines can be refilled on time.  Do not use any forms of alcohol.    Please Note:  If you have any questions at anytime after you are discharged please call [facility] detox unit 3AW at [phone].  [facility], Behavioral Intake [phone]  Medical Records call [phone]  Outpatient Behavioral Intake call [phone]  LP+ Wait List/Bed Availability call [phone]    Please remember to take all of your behavioral discharge planning and lab paperwork to any follow up appointments, it contains your lab results, diagnosis, medication list and discharge recommendations.      THANK YOU FOR CHOOSING [facility]

## 2024-07-08 NOTE — PLAN OF CARE
"Goal Outcome Evaluation:    Plan of Care Reviewed With: patient      Patient is up and visible in the milieu. Patient is ambulating independently, balanced and steady. Patient is alert and oriented x 4. Patient is selectively attending/participating in unit programming. Pt is selectively social with peers. Affect is blunted/flat, mood is calm. Patient is pleasant and cooperative on the unit. Patient denies SI/SIB/HI. Pt denies auditory/visual hallucinations. Patient verbally contracts for safety on the unit. Patient is tolerating medications well, denies any current side effects.     Patient remains out of detox monitoring status per unit protocol. Patient reports a good appetite, and good sleep. Patient discharge plans pending, patient reports. Rest, fluids, and food encouraged. Status 15 checks remain. Patient is able to make needs known appropriately. Patient denies any unmet needs at this time.     Blood pressure 115/81, pulse 86, temperature 97.6  F (36.4  C), temperature source Temporal, resp. rate 16, height 1.778 m (5' 10\"), weight 81.9 kg (180 lb 8 oz), SpO2 98%.          "

## 2024-07-08 NOTE — PROGRESS NOTES
Behavioral Team Discussion: (7/8/2024)    Continued Stay Criteria/Rationale: Patient admitted for Chemical Use Issues.  Plan: The following services will be provided to the patient; psychiatric assessment, medication management, therapeutic milieu, individual and group support, and skills groups.   Participants: 3A Provider: Dr. Payton Welch MD; 3A RN:  Luis Daniel Person RN; 3A CM's:  Lizzy Byrd .  Summary/Recommendation: Providers will assess today for treatment recommendations, discharge planning, and aftercare plans. CM will meet with pt for discharge planning.   Medical/Physical: Pt reports when he stops drinking he develops shakes, sweats, nausea and vomiting.   Precautions:   Behavioral Orders   Procedures    Code 1 - Restrict to Unit    Routine Programming     As clinically indicated    Status 15     Every 15 minutes.    Withdrawal precautions     Rationale for change in precautions or plan: N/A  Progress: Initial.    ASAM Dimension Scale Ratings:  Dimension 1: 1 Client can tolerate and cope with withdrawal discomfort. The client displays mild to moderate intoxication or signs and symptoms interfering with daily functioning but does not immediately endanger self or others. Client poses minimal risk of severe withdrawal.  Dimension 2: 1 Client tolerates and reed with physical discomfort and is able to get the services that the client needs.  Dimension 3: 2 Client has difficulty with impulse control and lacks coping skills. Client has thoughts of suicide or harm to others without means; however, the thoughts may interfere with participation in some treatment activities. Client has difficulty functioning in significant life areas. Client has moderate symptoms of emotional, behavioral, or cognitive problems. Client is able to participate in most treatment activities.  Dimension 4: 2 Client displays verbal compliance, but lacks consistent behaviors; has low motivation for change; and is passively involved  in treatment.  Dimension 5: 4 No awareness of the negative impact of mental health problems or substance abuse. No coping skills to arrest mental health or addiction illnesses, or prevent relapse.  Dimension 6: 3 Client is not engaged in structured, meaningful activity and the client's peers, family, significant other, and living environment are unsupportive, or there is significant criminal justice system involvement.

## 2024-07-08 NOTE — PROGRESS NOTES
Met with the patient today to discuss aftercare plans. The patient initially thought that he had a bed reserved at [facility] however after speaking with [place], they stated that he does not have a bed reserved, he is on the wait list.   The staff stated that a bed may not be available for another week but will call when one comes available.   The patient stated that going to his sisters is not an option and would like to go door to door.   The patient is currently filling out his paperwork for an assessment and then referrals can be sent out to other treatment facilities with earlier availability.

## 2024-07-08 NOTE — PROGRESS NOTES
Referrals have been sent out to the following places    [facility]  [address] [place] [address]  Phone: [phone]  Fax: [phone]    [facility]  [address] [place] [address]  Phone: [phone]  Fax: [phone]    [place]  [address] [place] [address]  Phone: [phone]  Fax: [phone]    [place]  [address] [place] [address]  Phone: [phone]  Fax: [phone]

## 2024-07-08 NOTE — PROGRESS NOTES
"[facility], [place]   Psychiatric Progress Note  Hospital Day: 3        Interim History:   The patient's care was discussed with the treatment team during the daily team meeting and/or staff's chart notes were reviewed.  Staff report patient has been visible in the milieu, taking medications as prescribed, MSSA scores improving, reporting improvement in withdrawal symptoms, has not had MSSA score >8 for 24 hours, out of detox status, plan to go to [facility] treatment.    Upon interview, the patient reports that his mood is \"good\", he slept well, no longer having any withdrawal symptoms, denies SI, HI or AVH. Tolerating medications including nicotine patch, declines need for medications on discharge, plans to resume smoking. Discussed MAT for AUD, he has taken naltrexone in the past but declines to restart any medications, he plans to discharge to CD treatment and states he can work with staff there if he changes his mind about medications. No additional concerns.     CTC updated later bed not available at [facility] for possibly a week, pt agreeable to other referrals, states he does not have safe place to discharge and wants door to door to CD treatment and concerns for relapse if discharged, working to find program that can take patient sooner, see CTC notes for details.          Medications:     Current Facility-Administered Medications   Medication Dose Route Frequency Provider Last Rate Last Admin    folic acid (FOLVITE) tablet 1 mg  1 mg Oral Daily Daniela Diamond APRN CNP   1 mg at 07/08/24 0828    nicotine (NICODERM CQ) 21 MG/24HR 24 hr patch 1 patch  1 patch Transdermal Daily Millie Denis APRN CNP   1 patch at 07/08/24 0829    thiamine (B-1) tablet 100 mg  100 mg Oral Daily Daniela Diamond APRN CNP   100 mg at 07/08/24 0829          Allergies:     Allergies   Allergen Reactions    Nickel Rash and Unknown          Labs:   No results found for this or any previous " "visit (from the past 48 hour(s)).       Psychiatric Examination:     /81   Pulse 86   Temp 97.6  F (36.4  C) (Temporal)   Resp 16   Ht 1.778 m (5' 10\")   Wt 81.9 kg (180 lb 8 oz)   SpO2 98%   BMI 25.90 kg/m    Weight is 180 lbs 8 oz  Body mass index is 25.9 kg/m .    Orthostatic Vitals       None            Appearance: awake, alert and adequately groomed  Attitude:  cooperative  Eye Contact:  good  Mood:   \"good\" appears euthymic  Affect:  appropriate and in normal range and mood congruent  Speech:  clear, coherent and normal prosody  Language: fluent and intact in English  Psychomotor, Gait, Musculoskeletal:  no evidence of tardive dyskinesia, dystonia, or tics  Thought Process:  logical, linear, and goal oriented  Associations:  no loose associations  Thought Content:  no evidence of suicidal ideation or homicidal ideation and no evidence of psychotic thought  Insight:  good  Judgement:  intact  Oriented to:  time, person, and place  Attention Span and Concentration:  intact  Recent and Remote Memory:  intact  Fund of Knowledge:  appropriate           Precautions:     Behavioral Orders   Procedures    Code 1 - Restrict to Unit    Routine Programming     As clinically indicated    Status 15     Every 15 minutes.    Withdrawal precautions          Diagnoses:     Alcohol use disorder, severe, dependence with withdrawal with complication   Polysubstance use disorder including opiates and stimulants  Hx of mood disorder  Nicotine dependence with withdrawal   Asthma mild, intermittent  Hypernatremia  HAGMA    Clinically Significant Risk Factors                               # Overweight: Estimated body mass index is 25.9 kg/m  as calculated from the following:    Height as of this encounter: 1.778 m (5' 10\").    Weight as of this encounter: 81.9 kg (180 lb 8 oz)., PRESENT ON ADMISSION     # Asthma: noted on problem list               Assessment & Plan:   Assessment and hospital summary:  This patient is a " 33 year old male with history of polysubstance use and mood disorder who presented to ED seeking detox from alcohol. Medically cleared in ED, admitted to 3A as voluntary patient. Drinking 500-750mL whiskey per day, EtOH KOSTAS 0.27(H), UDS positive for cannabinoids. MSSA with diazepam started for alcohol withdrawal. Withdrawal precautions in place. Admission labs ordered and reviewed those resulted. IM consult placed. Reporting history of mood disorder, depression, not endorsing current symptoms or safety concerns including SI and HI. Not taking medications PTA. Pt reports goals for hospitalization are to complete detox and then discharge to CD treatment, had believed he had bed held at [facility], [facility] working to confirm.     Psychiatric treatment/inteventions:  Medications:   -discontinue MSSA with diazepam, pt out of detox  -continue thiamine, folic acid, multivitamin for AUD  -pt declining MAT for AUD  -PRN hydroxyzine 25mg every 4 hours for anxiety   -PRN trazodone 50mg at bedtime for sleep   -wanting door to door discharge to CD treatment, CTC working on this      Laboratory/Imaging: reviewed since admission, no new labs ordered today     Patient will be treated in therapeutic milieu with appropriate individual and group therapies as described.    Medical treatment/interventions:  Medical concerns:   1) Alcohol withdrawal, improved  -discontinued MSSA as above  -PRN zofran and imodium for GI distress related to withdrawal   -withdrawal precautions    2) IM consult placed for management of other medical concerns, per consult note on 7/6/24:   Shailesh Armendariz is a 33 year old male admitted on 7/5/2024.for alcohol detox. He has a past medical history of alcohol use disorder, tobacco use disorder,  opiate use disorder previously on suboxone, bipolar disorder, asthma, . Medicine has been consulted for H&P during detox.     Medicine will sign off as there are not acute issues.      # Acute alcohol withdrawal:   #  "Polysubstance use disorder (alcohol, cannabinoids):   Presents seeking detox from alcohol.Last drink on 7/5 at 8 AM. Drinks 500-750 mL of whiskey per day.  Uses THC rarely in beverage form. No history of seizures or DTs. When he stops drinking he gets shakes, sweats, and N/V. Blood alcohol level of 0.27.Utox with cannabinoids. Alk phos WNL.Total bili WNL. AST: ALT WNL. MSSA score of 3-9 since arrival. Feels like his shaking and and stomach ache has improved.   - Management per Psychiatry  - No need for BMP/hepatic panel unless clinically indicated   - Agree with MSSA protocol  - Agree with MVI, folate, thiamine      #Tobacco use disorder:   - Nicotine patch and as needed gum/lozenges lozenges     # History of opiate use disorder on previously on Suboxone, resolved:  Last use of Suboxone was last spring.  - Monitor as clinically indicated     # Depression:  # PTSD:   Denies any mental health symptoms/SI/HI. Previously dx of bipolar disorder, but since he stopped   - Per Psychiatry      # Asthma, mild intermittant:   Previously on Advair 500-50 mcg BID, albuterol PRN no longer needed. No wheezing reported or noted on physical exam.   -PRN albuterol      # Hypernatremia:   Upon admission, Na = 146 mmol/L. Likely due to poor PO intake.  - Monitor as clinically indicated     # HAGMA:   16 upon admission.  Likely due to EtOH usage and poor oral intake.  - Monitor as clinically indicated        The patient's care was discussed with the Bedside Nurse, Patient, and Primary team (per         Clinically Significant Risk Factors Present on Admission                    # Overweight: Estimated body mass index is 25.9 kg/m  as calculated from the following:    Height as of this encounter: 1.778 m (5' 10\").    Weight as of this encounter: 81.9 kg (180 lb 8 oz).       # Asthma: noted on problem list            [facility]IM[facility]  Hospitalist Service          Disposition Plan   Reason for ongoing admission:  no safe " alternative to hospitalization, door to door discharge to CD treatment due to high risk of relapse  Discharge location: Chemical dependency treatment facility  Discharge Medications: not needed  Follow-up Appointments: not scheduled  Legal Status: voluntary    >50 min total time that was spent in counseling and coordination of care with staff, reviewing medical record, educating patient about treatment options, side effects and benefits and alternative treatments for medications, providing supportive therapy and redirection regarding above symptoms.     This document is created with the help of Dragon dictation system.  All grammatical/typing errors or context distortion are unintentional and inherent to software.    Patient has been seen and evaluated by Payton cleaning DO.

## 2024-07-08 NOTE — PLAN OF CARE
Problem: Adult Inpatient Plan of Care  Goal: Plan of Care Review  Description: The Plan of Care Review/Shift note should be completed every shift.  The Outcome Evaluation is a brief statement about your assessment that the patient is improving, declining, or no change.  This information will be displayed automatically on your shift  note.  Outcome: Progressing   Goal Outcome Evaluation:         Pt.appeared asleep through the night. Breathing was quiet and spontaneous. No prn medication administered. Will continue to monitor.

## 2024-07-08 NOTE — PROGRESS NOTES
"    [facility] Adult Detox (3a)         PATIENT'S NAME: Shailesh Armendariz  PREFERRED NAME: Shailesh  PRONOUNS: He / His   MRN: [id number]  : 1990  ADDRESS: [address] [place] [id number]  [facility]  [id number]  DATE OF SERVICE: 24  START TIME: 12:46 pm  END TIME: 1:30 pm  PREFERRED PHONE: [phone]  May we leave a program related message: Yes  EMAIL: [email or web address]   EMERGENCY CONTACT: was not obtained  .  SERVICE MODALITY:  In-person    UNIVERSAL ADULT Substance Use Disorder DIAGNOSTIC ASSESSMENT    Identifying Information:  Patient is a 33 year old,    individual.  Patient was referred for an assessment by self .  Patient attended the session alone.    Chief Complaint:   The reason for seeking services at this time is: \" I would like to complete detox and go door to door to residential treatment\"   The problem(s) began 28. Patient has not attempted to resolve these concerns in the past.  Patient does not appear to be in severe withdrawal, an imminent safety risk to self or others, or requiring immediate medical attention and may proceed with the assessment interview.    Social/Family History:  Patient reported they grew up in  family, moved around.  They were raised by father.  Patient reported that their childhood was no mother, lots of moving and starting over again.  Patient describes current relationships with family of origin as positive.      The patient describes their cultural background as , [place] and mixed .  Cultural influences and impact on patient's life structure, values, norms, and healthcare:  NA .  Contextual influences on patient's health include: Contextual Factors: Individual Factors SHERON .  Patient identified their preferred language to be English. Patient reported they do not need the assistance of an  or other support involved in therapy.  Patient reports they are involved in community of fantasma activities.  They " reports spirituality impacts recovery in the following ways:  discovering what going on inside of me.     Patient reported had no significant delays in developmental tasks.   Patient's highest education level was some college. Patient identified the following learning problems: none reported.  Patient reports they are  able to understand written materials.    Patient reported the following relationship history.  Patient's current relationship status is single for my whole life.   Patient identified their sexual orientation as heterosexual.  Patient reported having zero child(taco).     Patient's current living/housing situation involves  living in a hotel alone .  They lives alone and they report that housing is not stable. Patient identified friends and family  as part of their support system.  Patient identified the quality of these relationships as inconsistent.      Patient reports engaging in the following recreational/leisure activities: reading, games, music and writing.  Patient reports the following people are supportive of recovery: family and friends.  Patient is currently unemployed.  Patient reports their income is obtained through  no income .  Patient does identify finances as a current stressor.  Cultural and socioeconomic factors do not affect the patient's access to services.      Patient reports the following substance related arrests or legal issues: disorderly conduct.  Patient does report being on probation / parole / [facility] .    Patient's Strengths and Limitations:  Patient identified the following strengths or resources that will help them succeed in treatment:  going to treatment, attending 12 step meetings and getting a sober house. . Things that may interfere with the patient's success in treatment include: housing instability and probation .     Assessments:  The following assessments were completed by patient for  this visit:  GAD7:       7/8/2024     1:00 PM   ZACK-7 SCORE   Total Score 5     PROMIS 10-Global Health (all questions and answers displayed):       7/8/2024     1:00 PM   PROMIS 10   In general, would you say your health is: 4   In general, would you say your quality of life is: 3   In general, how would you rate your physical health? 4   In general, how would you rate your mental health, including your mood and your ability to think? 5   In general, how would you rate your satisfaction with your social activities and relationships? 2   In general, please rate how well you carry out your usual social activities and roles. (This includes activities at home, at work and in your community, and responsibilities as a parent, child, spouse, employee, friend, etc.) 3   To what extent are you able to carry out your everyday physical activities such as walking, climbing stairs, carrying groceries, or moving a chair? 5   In the past 7 days, how often have you been bothered by emotional problems such as feeling anxious, depressed, or irritable? 2   In the past 7 days, how would you rate your fatigue on average? 2   In the past 7 days, how would you rate your pain on average, where 0 means no pain, and 10 means worst imaginable pain? 0   Global Mental Health Score 14   Global Physical Health Score 18   PROMIS TOTAL - SUBSCORES 32     GAIN-sliding scale:      7/8/2024     1:00 PM   When was the last time that you had significant problems...   with feeling very trapped, lonely, sad, blue, depressed or hopeless about the future? 1+ years ago   with sleep trouble, such as bad dreams, sleeping restlessly, or falling asleep during the day? Past Month   with feeling very anxious, nervous, tense, scared, panicked or like something bad was going to happen? Past month   with becoming very distressed & upset when something reminded you of the past? Past month   with thinking about ending your life or committing suicide? Never           7/8/2024     1:00 PM   When was the last time that you did the following things 2 or more times?   Lied or conned to get things you wanted or to avoid having to do something? 1+ years ago   Had a hard time paying attention at school, work or home? 2 to 12 months ago   Had a hard time listening to instructions at school, work or home? Never   Were a bully or threatened other people? Never   Started physical fights with other people? Never       Personal and Family Medical History:  Patient did not report a family history of mental health concerns.       Patient reported the following previous mental health diagnoses: NA.  Patient reports their primary mental health symptoms include:  NA and these do not impact his ability to function.   Patient has received mental health services in the past: therapy and psychiatry.  Psychiatric Hospitalizations: [facility] in 2017 for SI .  Patient denies a history of civil commitment.  Current mental health services/providers include:  No current MH provider.    Patient has not had a physical exam to rule out medical causes for current symptoms.  Date of last physical exam was greater than a year ago and client was encouraged to schedule an exam with PCP. The patient does not have a Primary Care Provider and was encouraged to establish care with a PCP..  Patient reports no current medical concerns.  Patient denies any issues with pain..  There are not significant appetite / nutritional concerns / weight changes.  Patient does not report a history of an eating disorder.  Patient does not report a history of head injury / trauma / cognitive impairment.     Patient reports current meds as:   Current Facility-Administered Medications   Medication Dose Route Frequency Provider Last Rate Last Admin    acetaminophen (TYLENOL) tablet 650 mg  650 mg Oral Q4H PRN Daniela Diamond, APRN CNP   650 mg at 07/05/24 1067    albuterol (PROVENTIL HFA/VENTOLIN HFA) inhaler  1-2 puff  Inhalation Q6H PRN Sunni Alejo APRN CNP        alum & mag hydroxide-simethicone (MAALOX) suspension 30 mL  30 mL Oral Q4H PRN Daniela Diamond APRN CNP        folic acid (FOLVITE) tablet 1 mg  1 mg Oral Daily Daniela Diamond APRN CNP   1 mg at 07/08/24 0828    hydrOXYzine HCl (ATARAX) tablet 25 mg  25 mg Oral Q4H PRN Daniela Diamond APRN CNP        loperamide (IMODIUM) capsule 2 mg  2 mg Oral 4x Daily PRN Daniela Diamond APRN CNP        nicotine (NICODERM CQ) 21 MG/24HR 24 hr patch 1 patch  1 patch Transdermal Daily Millie Denis APRN CNP   1 patch at 07/08/24 0829    OLANZapine (zyPREXA) tablet 10 mg  10 mg Oral TID PRN Daniela Diamond APRN CNP        Or    OLANZapine (zyPREXA) injection 10 mg  10 mg Intramuscular TID PRN Daniela Diamond APRN CNP        ondansetron (ZOFRAN) tablet 4 mg  4 mg Oral Q6H PRN Daniela Diamond APRN CNP   4 mg at 07/05/24 1837    senna-docusate (SENOKOT-S/PERICOLACE) 8.6-50 MG per tablet 1 tablet  1 tablet Oral BID PRN Daniela Diamond APRN CNP        thiamine (B-1) tablet 100 mg  100 mg Oral Daily Daniela Diamond APRN CNP   100 mg at 07/08/24 0829    traZODone (DESYREL) tablet 50 mg  50 mg Oral At Bedtime PRN Daniela Diamond APRN CNP   50 mg at 07/07/24 2206       Medication Adherence:  Patient reports taking prescribed medications as prescribed.  Patient is able to self-administer medications.      Patient Allergies:    Allergies   Allergen Reactions    Nickel Rash and Unknown       Medical History:    Past Medical History:   Diagnosis Date    Asthma     Bipolar disorder (H)     Uncomplicated asthma          Substance Use:   Patient reported no family history of chemical health issues.  Patient has not received substance use disorder and/or gambling treatment in the past.  Patient has ever been to detox.  Patient is not currently receiving any chemical dependency treatment. Patient reports they have attended the following support groups: [facility] meetings in the  "past.        Substance Age of first use Pattern and duration of use (include amounts and frequency) Date of last use     Withdrawal potential Route of administration   has used Alcohol 15 Daily, drinking 1/5 of hard liquor 07/05/24 No oral   has not used Marijuana            has not used Amphetamines          has not used Cocaine/crack           has not used Hallucinogens        has not used Inhalants        has not used Heroin        has not used Other Opiates        has not used Benzodiazepine          has not used Barbiturates        has not used Over the counter meds.        has not use Caffeine        has not used Nicotine         has not used other substances not listed above:  Identify:             Patient reported the following problems as a result of their substance use: academic, family problems, financial problems, legal issues, and relationship problems.  Patient is concerned about substance use. Patient reports he is concerned about their substance use.  Patient reports their recovery goals are sober support, sober housing and help to stay sober.     Patient reports experiencing the following withdrawal symptoms within the past 12 months: none and the following within the past 30 days: sweating, shaky/jittery/tremors, unable to sleep, agitation, headache, irritability, nausea/vomiting, dizziness, diarrhea, diminished appetite, unable to eat, fever, and anxiety/worry.   Patients reports urges to use Alcohol.  Patient reports he has used more Alcohol than intended and over a longer period of time than intended. Patient reports he has had unsuccessful attempts to cut down or control use of Alcohol.  Patient reports longest period of abstinence was 1.5 years and return to use was due to \"I stopped going to meetings.\" Patient reports he has needed to use more Alcohol to achieve the same effect.  Patient does not report diminished effect with use of same amount of Alcohol.     Patient does  report a great " deal of time is spent in activities necessary to obtain, use, or recover from Alcohol effects.  Patient does  report important social, occupational, or recreational activities are given up or reduced because of Alcohol use.  Alcohol use is continued despite knowledge of having a persistent or recurrent physical or psychological problem that is likely to have caused or exacerbated by use.     Patient reports substance use has not ever impacted their ability to function in a school setting. Patient reports substance use has ever impacted their ability to function in a work setting.  Patients demographics and history impact their recovery in the following ways:  NA.  Patient reports engaging in the following recreation/leisure activities while using:  Pt denied activities.  Patient reports the following people are supportive of recovery: family and friends.    Patient does have a history of gambling concerns and/or treatment.  Patient does not have other addictive behaviors he is concerned about .      Significant Losses / Trauma / Abuse / Neglect Issues:   Patient   did not serve in the .  There are indications or report of significant loss, trauma, abuse or neglect issues related to: are no indications and client denies any losses, trauma, abuse, or neglect concerns.  Concerns for possible neglect are not present.     Safety Assessment:   Patient denies current homicidal ideation and behaviors.  Patient denies current self-injurious ideation and behaviors.    Patient denied risk behaviors associated with substance use.   Patient denies any high risk behaviors associated with mental health symptoms.  Patient reports the following current concerns for their personal safety: None.  Patient reports there   firearms in the house.       There are no firearms in the home..    History of Safety Concerns:  Patient denied a history of homicidal ideation.     Patient denied a history of personal safety concerns.     Patient denied a history of assaultive behaviors.    Patient denied a history of sexual assault behaviors.     Patient denied a history of risk behaviors associated with substance use.  Patient denies any history of high risk behaviors associated with mental health symptoms.  Patient reports the following protective factors:      Risk Plan:  See Recommendations for Safety and Risk Management Plan    Review of Symptoms per patient report:   Substance Use:  vomiting, daily use, substance related legal problems, substance use at work, family relationship problems due to substance use, driving under the influence, and cravings/urges to use     Diagnostic Criteria:  Substance Use Disorder Substance is often taken in larger amounts or over a longer period than was intended.  Met for:  Alcohol There is persistent desire or unsuccessful efforts to cut down or control use of the substance.  Met for:  Alcohol  A great deal of time is spent in activities necessary to obtain the substance, use the substance, or recover from its effects.  Met for:  Alcohol Recurrent use of the substance resulting in a failure to fulfill major role obligations at work, school, or home.  Met for:  Alcohol Continued use of the substance despite having persistent or recurrent social or interpersonal problems caused or exacerbated by the effects of its use.  Met for:  Alcohol Important social, occupational, or recreational activities are given up or reduced because of the substance.  Met for:  Alcohol Recurrent use of the substance in which it is physically hazardous.  Met for:  Alcohol Use of the substance is continued despite knowledge of having a persistent or recurrent physical or psychological problem that is likely to have been cause or exacerbated by the substance.  Met for:  Alcohol Withdrawal:  either patient endorses characteristic withdrawal syndrome for the substance or the substance (or closely related substance) is taken to relieve or  avoid withdrawal symptoms.  Met for:  Alcohol    Collateral Contact Summary:   Collateral contacts contributing to this assessment:  NA    If court related records were reviewed, summarize here: NA      Information in this assessment was obtained from the medical record and provided by patient who is a good historian.        As evidenced by self report and criteria, client meets the following DSM5 Diagnoses:   (Sustained by DSM5 Criteria Listed Above)  Alcohol Use Disorder   303.90 (F10.20) Severe In a controlled environment.    Recommendations:     1. Plan for Safety and Risk Management:  Recommended that patient call 911 or go to the local ED should there be a change in any of these risk factors..      Report to child / adult protection services was NA.     2. [facility]ER[facility] Referrals:   Recommendations: Patient meets criteria for the following levels of care based on ASAM Criteria:  Withdrawal Management - No Withdrawal Management Indicated, Treatment/Recovery Services - 3.5 Clinically Managed Medium and High Intensity Residential Services.  Patient's placement align's with the assessment and placement level of care recommendation based on current ASAM Dimension scale ratings.    .  Patient reports they are willing to follow these recommendations.  Patient would like the following family or other support people involved in their treatment:  NA. Patient does not have a history of opiate use.    Referral: [facility]     3. Mental Health Referrals:  Pt would benefit from establishing mental health services in the community.     4. Clinical Substantiation/medical necessity for the above recommendations:    Dimension Scale Ratings:    Dimension 1: 0 Client displays full functioning with good ability to tolerate and cope with withdrawal discomfort. No signs or symptoms of intoxication or withdrawal or resolving signs or symptoms    Dimension 2: 1 Client tolerates and reed with physical discomfort and is able to get the  services that the client needs.    Dimension 3: 2 Client has difficulty with impulse control and lacks coping skills. Client has thoughts of suicide or harm to others without means; however, the thoughts may interfere with participation in some treatment activities. Client has difficulty functioning in significant life areas. Client has moderate symptoms of emotional, behavioral, or cognitive problems. Client is able to participate in most treatment activities.    Dimension 4: 2 Client displays verbal compliance, but lacks consistent behaviors; has low motivation for change; and is passively involved in treatment.      Dimension 5: 4 No awareness of the negative impact of mental health problems or substance abuse. No coping skills to arrest mental health or addiction illnesses, or prevent relapse.      Dimension 6: 4 Client has (A) Chronically antagonistic significant other, living environment, family, peer group or long-term criminal justice involvement that is harmful to recovery or treatment progress, or (B) Client has an actively antagonistic significant other, family, work, or living environment with immediate threat to the client's safety and well-being.      5. Patient's identified  NA .     6. Recommendations for treatment focus:   Alcohol / Substance Use -   .     7.  Interactive Complexity: No        Provider Name/ Credentials:  LESLY Gay   July 8, 2024

## 2024-07-09 VITALS
HEART RATE: 94 BPM | RESPIRATION RATE: 16 BRPM | BODY MASS INDEX: 25.84 KG/M2 | SYSTOLIC BLOOD PRESSURE: 132 MMHG | DIASTOLIC BLOOD PRESSURE: 86 MMHG | TEMPERATURE: 97.6 F | HEIGHT: 70 IN | WEIGHT: 180.5 LBS | OXYGEN SATURATION: 100 %

## 2024-07-09 PROCEDURE — 99239 HOSP IP/OBS DSCHRG MGMT >30: CPT | Performed by: PSYCHIATRY & NEUROLOGY

## 2024-07-09 PROCEDURE — 250N000013 HC RX MED GY IP 250 OP 250 PS 637: Performed by: REGISTERED NURSE

## 2024-07-09 PROCEDURE — 250N000013 HC RX MED GY IP 250 OP 250 PS 637

## 2024-07-09 RX ADMIN — NICOTINE 1 PATCH: 21 PATCH, EXTENDED RELEASE TRANSDERMAL at 08:39

## 2024-07-09 RX ADMIN — FOLIC ACID 1 MG: 1 TABLET ORAL at 08:37

## 2024-07-09 RX ADMIN — THIAMINE HCL TAB 100 MG 100 MG: 100 TAB at 08:37

## 2024-07-09 ASSESSMENT — ACTIVITIES OF DAILY LIVING (ADL)
ORAL_HYGIENE: INDEPENDENT
ADLS_ACUITY_SCORE: 30
HYGIENE/GROOMING: INDEPENDENT
ADLS_ACUITY_SCORE: 30

## 2024-07-09 NOTE — PLAN OF CARE
"Patient visible in milieu, social with peers. Affect euthymic, mood is calm. Denies SI, SIB, HI, or hallucinations.   Continues to be \"out of detox\" for alcohol withdrawal.  VSS, appetite is good, denies pain.  Patient was given prn Trazodone 50 mg at HS for sleep.   Patient plans to discharge to a residential CD treatment program. Would like to go door to door. Is waiting to hear back from referrals sent.  Denies any additional concerns.  /81 (BP Location: Left arm, Patient Position: Sitting, Cuff Size: Adult Regular)   Pulse 89   Temp 98.2  F (36.8  C) (Oral)   Resp 16   Ht 1.778 m (5' 10\")   Wt 81.9 kg (180 lb 8 oz)   SpO2 95%   BMI 25.90 kg/m     Problem: Adult Behavioral Health Plan of Care  Goal: Optimized Coping Skills in Response to Life Stressors  Intervention: Promote Effective Coping Strategies  Recent Flowsheet Documentation  Taken 7/8/2024 2100 by Barb Wilson RN  Supportive Measures:   active listening utilized   counseling provided   decision-making supported   journaling promoted   positive reinforcement provided   problem-solving facilitated   relaxation techniques promoted   self-reflection promoted   self-care encouraged   self-responsibility promoted   verbalization of feelings encouraged   Goal Outcome Evaluation:    Plan of Care Reviewed With: patient                   "

## 2024-07-09 NOTE — DISCHARGE SUMMARY
Psychiatric Discharge Summary    Shailesh Armendariz MRN# [id number]   Age: 33 year old YOB: 1990     Date of Admission:  7/5/2024  Date of Discharge:  7/9/2024 11:45 AM  Admitting Physician:  Chan Hayes MD  Discharge Physician:  Payton Welch DO         Event Leading to Hospitalization:   33 year old male.     Patient states that he first started drinking at age 15 but his drinking became heavy over the past few years     Patient has had 4 prior treatments for drug dependence between 5-10 years ago. His drug of choice were opiates and methamphetamine. He has been clean from drugs for the past 5 years since his last treatment.     Patient has no DUI history. He has had a few blackouts. He has never had seizures. He has been drinking daily between 500 and 750 ml a day.     Patient is not treated for depression. He states that he had problems with depression in the past but denies problems with depression recently. He denies suicidal or homicidal thoughts.     Patient was diagnosed with bipolar while using drugs, but since he stopped using drugs he has not had problems with mood and has not been on medications for his mood.         According to ER report:  Shailesh Armendariz is a 33 year old male with history of bipolar disorder, alcohol use disorder, prior polysubstance use (meth, cannabinoids, opiates previously on Suboxone) who presents to the emergency department seeking alcohol detox. He has been drinking 1/5th to half a liter of vodka a day, last drank this morning. No history of withdrawal seizures or DTs. When he stops drinking he develops shakes, sweats, nausea and vomiting. He is planning on entering treatment 3 days from now. He also had THC drinks a couple days ago. He is off suboxone, denies other substance use. Denies psychiatric symptoms; no suicidal or homicidal ideation.    Patient with a history of polysubstance abuse and alcohol dependence presenting seeking detox from alcohol. Has  "been drinking up to half a liter per day. Presents with alcohol level 0.27. No history of DTs or seizures but does develop symptomatic alcohol withdrawal and has been unable to stop in the community without assistance. He has a prior history of polysubstance abuse is still using cannabinoid beverages at times but sporadic. Denies use of any other drugs and is no longer on Suboxone. His urine drug screen is positive only for THC.   Conor Schulz MD  [facility] EMERGENCY DEPARTMENT  7/5/2024       See Admission note by Sriram Mishra MD on 7/6/24 for additional details.          Diagnoses:     Alcohol use disorder, severe, dependence with withdrawal with complication   Polysubstance use disorder including opiates and stimulants  Hx of mood disorder  Nicotine dependence with withdrawal   Asthma mild, intermittent  Hypernatremia  HAGMA    Clinically Significant Risk Factors                               # Overweight: Estimated body mass index is 25.9 kg/m  as calculated from the following:    Height as of this encounter: 1.778 m (5' 10\").    Weight as of this encounter: 81.9 kg (180 lb 8 oz)., PRESENT ON ADMISSION     # Asthma: noted on problem list               Labs:     Recent Results (from the past 168 hour(s))   Urine Drug Screen Panel    Collection Time: 07/05/24 11:29 AM   Result Value Ref Range    Amphetamines Urine Screen Negative Screen Negative    Barbituates Urine Screen Negative Screen Negative    Benzodiazepine Urine Screen Negative Screen Negative    Cannabinoids Urine Screen Positive (A) Screen Negative    Cocaine Urine Screen Negative Screen Negative    Fentanyl Qual Urine Screen Negative Screen Negative    Opiates Urine Screen Negative Screen Negative    PCP Urine Screen Negative Screen Negative   Comprehensive metabolic panel    Collection Time: 07/05/24 11:36 AM   Result Value Ref Range    Sodium 146 (H) 135 - 145 mmol/L    Potassium 4.2 3.4 - 5.3 mmol/L    Carbon Dioxide (CO2) 27 22 - " 29 mmol/L    Anion Gap 16 (H) 7 - 15 mmol/L    Urea Nitrogen 9.3 6.0 - 20.0 mg/dL    Creatinine 0.89 0.67 - 1.17 mg/dL    GFR Estimate >90 >60 mL/min/1.73m2    Calcium 9.8 8.6 - 10.0 mg/dL    Chloride 103 98 - 107 mmol/L    Glucose 98 70 - 99 mg/dL    Alkaline Phosphatase 58 40 - 150 U/L    AST 41 0 - 45 U/L    ALT 47 0 - 70 U/L    Protein Total 7.4 6.4 - 8.3 g/dL    Albumin 4.9 3.5 - 5.2 g/dL    Bilirubin Total 0.2 <=1.2 mg/dL   Magnesium    Collection Time: 07/05/24 11:36 AM   Result Value Ref Range    Magnesium 2.2 1.7 - 2.3 mg/dL   Ethyl Alcohol Level    Collection Time: 07/05/24 11:36 AM   Result Value Ref Range    Alcohol ethyl 0.27 (H) <=0.01 g/dL   CBC with platelets and differential    Collection Time: 07/05/24 11:36 AM   Result Value Ref Range    WBC Count 7.7 4.0 - 11.0 10e3/uL    RBC Count 5.04 4.40 - 5.90 10e6/uL    Hemoglobin 16.3 13.3 - 17.7 g/dL    Hematocrit 47.8 40.0 - 53.0 %    MCV 95 78 - 100 fL    MCH 32.3 26.5 - 33.0 pg    MCHC 34.1 31.5 - 36.5 g/dL    RDW 13.5 10.0 - 15.0 %    Platelet Count 287 150 - 450 10e3/uL    % Neutrophils 63 %    % Lymphocytes 26 %    % Monocytes 7 %    % Eosinophils 2 %    % Basophils 1 %    % Immature Granulocytes 1 %    NRBCs per 100 WBC 0 <1 /100    Absolute Neutrophils 4.9 1.6 - 8.3 10e3/uL    Absolute Lymphocytes 2.0 0.8 - 5.3 10e3/uL    Absolute Monocytes 0.6 0.0 - 1.3 10e3/uL    Absolute Eosinophils 0.1 0.0 - 0.7 10e3/uL    Absolute Basophils 0.1 0.0 - 0.2 10e3/uL    Absolute Immature Granulocytes 0.1 <=0.4 10e3/uL    Absolute NRBCs 0.0 10e3/uL              Consults:   IM consult placed for management of other medical concerns, per consult note on 7/6/24:   Shailesh Armendariz is a 33 year old male admitted on 7/5/2024.for alcohol detox. He has a past medical history of alcohol use disorder, tobacco use disorder,  opiate use disorder previously on suboxone, bipolar disorder, asthma, . Medicine has been consulted for H&P during detox.     Medicine will sign off as  "there are not acute issues.      # Acute alcohol withdrawal:   # Polysubstance use disorder (alcohol, cannabinoids):   Presents seeking detox from alcohol.Last drink on 7/5 at 8 AM. Drinks 500-750 mL of whiskey per day.  Uses THC rarely in beverage form. No history of seizures or DTs. When he stops drinking he gets shakes, sweats, and N/V. Blood alcohol level of 0.27.Utox with cannabinoids. Alk phos WNL.Total bili WNL. AST: ALT WNL. MSSA score of 3-9 since arrival. Feels like his shaking and and stomach ache has improved.   - Management per Psychiatry  - No need for BMP/hepatic panel unless clinically indicated   - Agree with MSSA protocol  - Agree with MVI, folate, thiamine      #Tobacco use disorder:   - Nicotine patch and as needed gum/lozenges lozenges     # History of opiate use disorder on previously on Suboxone, resolved:  Last use of Suboxone was last spring.  - Monitor as clinically indicated     # Depression:  # PTSD:   Denies any mental health symptoms/SI/HI. Previously dx of bipolar disorder, but since he stopped   - Per Psychiatry      # Asthma, mild intermittant:   Previously on Advair 500-50 mcg BID, albuterol PRN no longer needed. No wheezing reported or noted on physical exam.   -PRN albuterol      # Hypernatremia:   Upon admission, Na = 146 mmol/L. Likely due to poor PO intake.  - Monitor as clinically indicated     # HAGMA:   16 upon admission.  Likely due to EtOH usage and poor oral intake.  - Monitor as clinically indicated        The patient's care was discussed with the Bedside Nurse, Patient, and Primary team (per         Clinically Significant Risk Factors Present on Admission                    # Overweight: Estimated body mass index is 25.9 kg/m  as calculated from the following:    Height as of this encounter: 1.778 m (5' 10\").    Weight as of this encounter: 81.9 kg (180 lb 8 oz).       # Asthma: noted on problem list            [facility]  Hospitalist Service            " Hospital Course:   Shailesh Armendariz is a 33 year old male with history of polysubstance use and mood disorder who presented to ED seeking detox from alcohol. Medically cleared in ED, admitted to  as voluntary patient. Drinking 500-750mL whiskey per day, EtOH KOSTAS 0.27(H), UDS positive for cannabinoids. MSSA with diazepam started for alcohol withdrawal. Withdrawal precautions in place. Admission labs ordered and reviewed those resulted. IM consult placed. Reporting history of mood disorder, depression, not endorsing current symptoms or safety concerns including SI and HI. Not taking medications PTA. Pt reports goals for hospitalization are to complete detox and then discharge to  treatment, had believed he had bed held at [facility], [facility] working to confirm.      Shailesh Armendariz did participate in groups and was visible in the milieu. The patient's symptoms of alcohol withdrawal improved. He was accepted to bed at [facility] on 7/9, he declined any medications for MAT for AUD prior to discharge and stated if he changed his mind would discuss with staff at  treatment.     Today Shailesh Armendariz reports having no thoughts of harming self or others. In addition, he has notable risk factors for self-harm, including age, single status, and substance abuse. However, risk is mitigated by commitment to family, ability to volunteer a safety plan, history of seeking help when needed, and denies hx of past attempts and is future oriented. Therefore, based on all available evidence including the factors cited above, he does not appear to be at imminent risk for self-harm, does not meet criteria for a 72-hr hold, and therefore remains appropriate for ongoing outpatient level of care.     Shailesh Armendariz was  discharged  to  [facility] residential  treatment . At the time of discharge Shailesh Armendariz was determined to not be a danger to himself or others.          Discharge Medications:     Discharge Medication List as of 7/9/2024 11:46 AM     "    CONTINUE these medications which have CHANGED    Details   albuterol (PROAIR HFA/PROVENTIL HFA/VENTOLIN HFA) 108 (90 Base) MCG/ACT inhaler Inhale 1-2 puffs into the lungs every 6 hours as needed for shortness of breath, HistoricalPharmacy may dispense brand covered by insurance (Proair, or proventil or ventolin or generic albuterol inhaler)           CONTINUE these medications which have NOT CHANGED    Details   acetaminophen (TYLENOL) 500 MG tablet Take 1,000 mg by mouth every 6 hours as needed for mild pain, Historical      COCONUT OIL PO Take 1 capsule by mouth daily (unknown dose), Historical      Cyanocobalamin (B-12 PO) Take 1 tablet by mouth daily (unknown dose), Historical      ibuprofen (ADVIL/MOTRIN) 800 MG tablet Take 800 mg by mouth every 8 hours as needed for moderate pain, Historical      N-ACETYL CYSTEINE PO Take 1 tablet by mouth daily, Historical      Omega-3 Fatty Acids (FISH OIL PO) Take 1 capsule by mouth daily (unknown dose), Historical      Pyridoxine HCl (VITAMIN B6 PO) Take 1 tablet by mouth daily (unknown dose), Historical      TURMERIC PO Take 1 capsule by mouth daily (unknown dose), Historical      Vitamin D3 (CHOLECALCIFEROL) 25 mcg (1000 units) tablet Take 1 tablet by mouth daily, Historical      Multiple Vitamin (ONE-A-DAY MENS PO) Take 1 tablet by mouth daily, Historical                  Psychiatric Examination:   Appearance:  awake, alert and adequately groomed  Attitude:  cooperative  Eye Contact:  good  Mood:   \"good\"  Affect:  appropriate and in normal range and mood congruent  Speech:  clear, coherent and normal prosody  Psychomotor Behavior:  no evidence of tardive dyskinesia, dystonia, or tics  Thought Process:  logical, linear, and goal oriented  Associations:  no loose associations  Thought Content:  no evidence of suicidal ideation or homicidal ideation and no evidence of psychotic thought  Insight:  good  Judgment:  intact  Oriented to:  time, person, and " place  Attention Span and Concentration:  intact  Recent and Remote Memory:  intact  Language: English, fluent  Fund of Knowledge: appropriate  Muscle Strength and Tone: normal  Gait and Station: Normal         Discharge Plan:   Recommendation:  Admit to [facility] and follow their recommendations. Return to [place] if a higher level of care is needed.     Disposition: [facility]  [address], [place] [address]   [place] mi  [phone]     Follow-up Appointment:   You are aware you should make a follow up appointment with your primary care doctor for medical and medication management   The patient is seeking inpatient treatment and will work with his care team to schedule therapy and psychiatry appointments.        Attestation:  Patient has been seen and evaluated by me, Payton Welch DO on day of discharge. 32 minutes were spent in coordination of discharge planning.

## 2024-07-09 NOTE — PROGRESS NOTES
Met with the patient and discussed the referrals sent out. Notified the patient that [facility] has received the referral and is reviewing them today.    [facility] called at 1040am stating that they have an opening and they would be willing to transport the patient today. At the time of this report, [facility] is communicating with their transport to set up a time for transport.   Nursing and the provider have been notified.

## 2024-07-09 NOTE — PLAN OF CARE
Problem: Adult Inpatient Plan of Care  Goal: Absence of Hospital-Acquired Illness or Injury  Outcome: Progressing   Goal Outcome Evaluation:         Pt alert and slightly guarded, denies SI/HI, will be discharging to [facility], non med, pt is Junior stable, endorsing a good appetite, no pain.    Will review discharge paperwork and instructions prior to discharge.         Belongings returned, pt ate lunch, discharge paperwork reviewed, pt escorted off the unit by Tr REYES, Siddhartha knife returned to pt from security desk, pt leaving for [facility].

## 2024-07-09 NOTE — PLAN OF CARE
Goal Outcome Evaluation:         The patient slept through the night. Breathing was quiet and unlabored.  He had been out of detox. No safety or behavioral concerns were reported or observed. Staff will continue to monitor.

## 2024-07-09 NOTE — PLAN OF CARE
"  Problem: Adult Inpatient Plan of Care  Goal: Plan of Care Review  Description: The Plan of Care Review/Shift note should be completed every shift.  The Outcome Evaluation is a brief statement about your assessment that the patient is improving, declining, or no change.  This information will be displayed automatically on your shift  note.  Outcome: Adequate for Care Transition  Goal: Patient-Specific Goal (Individualized)  Description: You can add care plan individualizations to a care plan. Examples of Individualization might be:  \"Parent requests to be called daily at 9am for status\", \"I have a hard time hearing out of my right ear\", or \"Do not touch me to wake me up as it startles  me\".  Outcome: Adequate for Care Transition  Goal: Absence of Hospital-Acquired Illness or Injury  7/9/2024 1212 by Stephanie Mishra, RN  Outcome: Adequate for Care Transition  7/9/2024 1106 by Stephanie Mishra, RN  Outcome: Progressing  Goal: Optimal Comfort and Wellbeing  Outcome: Adequate for Care Transition  Goal: Readiness for Transition of Care  Outcome: Adequate for Care Transition   Goal Outcome Evaluation:                        "